# Patient Record
Sex: MALE | Race: BLACK OR AFRICAN AMERICAN | NOT HISPANIC OR LATINO | Employment: FULL TIME
[De-identification: names, ages, dates, MRNs, and addresses within clinical notes are randomized per-mention and may not be internally consistent; named-entity substitution may affect disease eponyms.]

---

## 2021-06-13 ENCOUNTER — TELEPHONE (OUTPATIENT)
Dept: SCHEDULING | Age: 26
End: 2021-06-13

## 2021-07-10 ENCOUNTER — TELEPHONE (OUTPATIENT)
Dept: SCHEDULING | Age: 26
End: 2021-07-10

## 2021-07-12 ENCOUNTER — OFFICE VISIT (OUTPATIENT)
Dept: INTERNAL MEDICINE | Age: 26
End: 2021-07-12

## 2021-07-12 ENCOUNTER — TELEPHONE (OUTPATIENT)
Dept: SCHEDULING | Age: 26
End: 2021-07-12

## 2021-07-12 VITALS
RESPIRATION RATE: 16 BRPM | SYSTOLIC BLOOD PRESSURE: 117 MMHG | HEIGHT: 74 IN | DIASTOLIC BLOOD PRESSURE: 67 MMHG | TEMPERATURE: 98.7 F | HEART RATE: 69 BPM | BODY MASS INDEX: 39.54 KG/M2 | WEIGHT: 308.09 LBS

## 2021-07-12 DIAGNOSIS — Z11.3 ROUTINE SCREENING FOR STI (SEXUALLY TRANSMITTED INFECTION): ICD-10-CM

## 2021-07-12 DIAGNOSIS — E66.01 SEVERE OBESITY (CMD): ICD-10-CM

## 2021-07-12 DIAGNOSIS — Z00.00 VISIT FOR PREVENTIVE HEALTH EXAMINATION: Primary | ICD-10-CM

## 2021-07-12 PROCEDURE — 99385 PREV VISIT NEW AGE 18-39: CPT | Performed by: INTERNAL MEDICINE

## 2021-07-12 ASSESSMENT — PAIN SCALES - GENERAL: PAINLEVEL: 0

## 2022-09-06 ENCOUNTER — HOSPITAL ENCOUNTER (EMERGENCY)
Facility: HOSPITAL | Age: 27
Discharge: HOME | End: 2022-09-07
Attending: EMERGENCY MEDICINE
Payer: COMMERCIAL

## 2022-09-06 ENCOUNTER — APPOINTMENT (OUTPATIENT)
Dept: GENERAL RADIOLOGY | Facility: HOSPITAL | Age: 27
End: 2022-09-06
Payer: COMMERCIAL

## 2022-09-06 ENCOUNTER — APPOINTMENT (OUTPATIENT)
Dept: GENERAL RADIOLOGY | Facility: HOSPITAL | Age: 27
End: 2022-09-06
Attending: EMERGENCY MEDICINE
Payer: COMMERCIAL

## 2022-09-06 DIAGNOSIS — R06.00 DYSPNEA, UNSPECIFIED TYPE: ICD-10-CM

## 2022-09-06 DIAGNOSIS — R07.89 CHEST PAIN, ATYPICAL: Primary | ICD-10-CM

## 2022-09-06 LAB
ANION GAP SERPL CALC-SCNC: 6 MMOL/L (ref 0–18)
BASOPHILS # BLD AUTO: 0.05 X10(3) UL (ref 0–0.2)
BASOPHILS NFR BLD AUTO: 0.6 %
BUN BLD-MCNC: 14 MG/DL (ref 7–18)
BUN/CREAT SERPL: 8.5 (ref 10–20)
CALCIUM BLD-MCNC: 9.5 MG/DL (ref 8.5–10.1)
CHLORIDE SERPL-SCNC: 106 MMOL/L (ref 98–112)
CO2 SERPL-SCNC: 30 MMOL/L (ref 21–32)
CREAT BLD-MCNC: 1.64 MG/DL
D DIMER PPP FEU-MCNC: 0.37 UG/ML FEU (ref ?–0.5)
DEPRECATED RDW RBC AUTO: 36.9 FL (ref 35.1–46.3)
EOSINOPHIL # BLD AUTO: 0.17 X10(3) UL (ref 0–0.7)
EOSINOPHIL NFR BLD AUTO: 2.1 %
ERYTHROCYTE [DISTWIDTH] IN BLOOD BY AUTOMATED COUNT: 11.6 % (ref 11–15)
GFR SERPLBLD BASED ON 1.73 SQ M-ARVRAT: 59 ML/MIN/1.73M2 (ref 60–?)
GLUCOSE BLD-MCNC: 92 MG/DL (ref 70–99)
HCT VFR BLD AUTO: 39.5 %
HGB BLD-MCNC: 13.7 G/DL
IMM GRANULOCYTES # BLD AUTO: 0.02 X10(3) UL (ref 0–1)
IMM GRANULOCYTES NFR BLD: 0.2 %
LYMPHOCYTES # BLD AUTO: 4.2 X10(3) UL (ref 1–4)
LYMPHOCYTES NFR BLD AUTO: 52.4 %
MCH RBC QN AUTO: 30 PG (ref 26–34)
MCHC RBC AUTO-ENTMCNC: 34.7 G/DL (ref 31–37)
MCV RBC AUTO: 86.6 FL
MONOCYTES # BLD AUTO: 0.58 X10(3) UL (ref 0.1–1)
MONOCYTES NFR BLD AUTO: 7.2 %
NEUTROPHILS # BLD AUTO: 2.99 X10 (3) UL (ref 1.5–7.7)
NEUTROPHILS # BLD AUTO: 2.99 X10(3) UL (ref 1.5–7.7)
NEUTROPHILS NFR BLD AUTO: 37.5 %
OSMOLALITY SERPL CALC.SUM OF ELEC: 294 MOSM/KG (ref 275–295)
PLATELET # BLD AUTO: 210 10(3)UL (ref 150–450)
POTASSIUM SERPL-SCNC: 4.2 MMOL/L (ref 3.5–5.1)
RBC # BLD AUTO: 4.56 X10(6)UL
SODIUM SERPL-SCNC: 142 MMOL/L (ref 136–145)
TROPONIN I HIGH SENSITIVITY: 6 NG/L
WBC # BLD AUTO: 8 X10(3) UL (ref 4–11)

## 2022-09-06 PROCEDURE — 80048 BASIC METABOLIC PNL TOTAL CA: CPT | Performed by: EMERGENCY MEDICINE

## 2022-09-06 PROCEDURE — 93005 ELECTROCARDIOGRAM TRACING: CPT

## 2022-09-06 PROCEDURE — 93010 ELECTROCARDIOGRAM REPORT: CPT | Performed by: EMERGENCY MEDICINE

## 2022-09-06 PROCEDURE — 85379 FIBRIN DEGRADATION QUANT: CPT | Performed by: EMERGENCY MEDICINE

## 2022-09-06 PROCEDURE — 99284 EMERGENCY DEPT VISIT MOD MDM: CPT

## 2022-09-06 PROCEDURE — 84484 ASSAY OF TROPONIN QUANT: CPT | Performed by: EMERGENCY MEDICINE

## 2022-09-06 PROCEDURE — 85025 COMPLETE CBC W/AUTO DIFF WBC: CPT | Performed by: EMERGENCY MEDICINE

## 2022-09-06 PROCEDURE — 71045 X-RAY EXAM CHEST 1 VIEW: CPT

## 2022-09-06 PROCEDURE — 36415 COLL VENOUS BLD VENIPUNCTURE: CPT

## 2022-09-06 RX ORDER — OMEPRAZOLE 20 MG/1
20 CAPSULE, DELAYED RELEASE ORAL DAILY
Qty: 30 CAPSULE | Refills: 0 | Status: SHIPPED | OUTPATIENT
Start: 2022-09-06 | End: 2022-10-06

## 2022-09-07 VITALS
TEMPERATURE: 98 F | BODY MASS INDEX: 37.22 KG/M2 | SYSTOLIC BLOOD PRESSURE: 126 MMHG | HEIGHT: 74 IN | OXYGEN SATURATION: 98 % | RESPIRATION RATE: 18 BRPM | HEART RATE: 58 BPM | WEIGHT: 290 LBS | DIASTOLIC BLOOD PRESSURE: 81 MMHG

## 2022-09-07 NOTE — ED INITIAL ASSESSMENT (HPI)
Difficulty breathing x 2 weeks. States he awakens from sleep SOB. significant other reports he has periods of apnea while asleep.

## 2023-08-14 ENCOUNTER — HOSPITAL ENCOUNTER (OUTPATIENT)
Age: 28
Discharge: HOME | End: 2023-08-14
Payer: COMMERCIAL

## 2023-08-14 VITALS
OXYGEN SATURATION: 100 % | SYSTOLIC BLOOD PRESSURE: 155 MMHG | HEIGHT: 74 IN | HEART RATE: 72 BPM | BODY MASS INDEX: 36.45 KG/M2 | DIASTOLIC BLOOD PRESSURE: 80 MMHG | RESPIRATION RATE: 20 BRPM | TEMPERATURE: 99 F | WEIGHT: 284 LBS

## 2023-08-14 DIAGNOSIS — U07.1 COVID-19: Primary | ICD-10-CM

## 2023-08-14 DIAGNOSIS — Z20.822 ENCOUNTER FOR LABORATORY TESTING FOR COVID-19 VIRUS: ICD-10-CM

## 2023-08-14 LAB
BUN BLD-MCNC: 11 MG/DL (ref 7–18)
CHLORIDE BLD-SCNC: 101 MMOL/L (ref 98–112)
CO2 BLD-SCNC: 28 MMOL/L (ref 21–32)
CREAT BLD-MCNC: 1.5 MG/DL
EGFRCR SERPLBLD CKD-EPI 2021: 65 ML/MIN/1.73M2 (ref 60–?)
GLUCOSE BLD-MCNC: 76 MG/DL (ref 70–99)
HCT VFR BLD CALC: 41 %
ISTAT IONIZED CALCIUM FOR CHEM 8: 1.21 MMOL/L (ref 1.12–1.32)
POTASSIUM BLD-SCNC: 3.7 MMOL/L (ref 3.6–5.1)
SARS-COV-2 RNA RESP QL NAA+PROBE: DETECTED
SODIUM BLD-SCNC: 142 MMOL/L (ref 136–145)

## 2023-08-14 PROCEDURE — 99203 OFFICE O/P NEW LOW 30 MIN: CPT | Performed by: NURSE PRACTITIONER

## 2023-08-14 PROCEDURE — U0002 COVID-19 LAB TEST NON-CDC: HCPCS | Performed by: NURSE PRACTITIONER

## 2023-08-14 PROCEDURE — 80047 BASIC METABLC PNL IONIZED CA: CPT | Performed by: NURSE PRACTITIONER

## 2023-08-14 RX ORDER — NIRMATRELVIR AND RITONAVIR 300-100 MG
KIT ORAL
Qty: 30 TABLET | Refills: 0 | Status: SHIPPED | OUTPATIENT
Start: 2023-08-14 | End: 2023-08-14 | Stop reason: CLARIF

## 2023-08-14 RX ORDER — NIRMATRELVIR AND RITONAVIR 300-100 MG
KIT ORAL
Qty: 30 TABLET | Refills: 0 | Status: SHIPPED | OUTPATIENT
Start: 2023-08-14 | End: 2023-08-19

## 2023-08-15 NOTE — DISCHARGE INSTRUCTIONS
Please drink plenty of fluids  Steam showers  Take ibuprofen (Motrin, Advil) 600 mg every 6 hours for fever/aches, take with food  OR  Acetaminophen (Tylenol) 650-1000 mg every 6 hours for fever/aches  Rest!  Mucinex DM twice per day for 7 days, with plenty of fluids  For chest pain, shortness of breath or worsening symptoms, please go to ER  Please see your primary care provider if no improvement in 5-7 days    You tested positive for COVID 19 today  Please quarantine for 5 days, beginning tomorrow.  After the 5 days, you can break quarantine as long as you can wear a mask at all times for an additional 5 days  For chest pain, shortness of breath or worsening symptoms, please go to ER    Please follow up with primary care provider after quarantine to discussed your creatinine

## 2023-09-06 ENCOUNTER — LAB ENCOUNTER (OUTPATIENT)
Dept: LAB | Age: 28
End: 2023-09-06
Attending: INTERNAL MEDICINE
Payer: COMMERCIAL

## 2023-09-06 ENCOUNTER — OFFICE VISIT (OUTPATIENT)
Dept: INTERNAL MEDICINE CLINIC | Facility: CLINIC | Age: 28
End: 2023-09-06

## 2023-09-06 VITALS
WEIGHT: 286 LBS | DIASTOLIC BLOOD PRESSURE: 76 MMHG | HEART RATE: 72 BPM | BODY MASS INDEX: 36.7 KG/M2 | SYSTOLIC BLOOD PRESSURE: 125 MMHG | HEIGHT: 74 IN

## 2023-09-06 DIAGNOSIS — R74.8 ELEVATED CREATINE KINASE: ICD-10-CM

## 2023-09-06 DIAGNOSIS — Z84.1 FAMILY HISTORY OF KIDNEY DISEASE: ICD-10-CM

## 2023-09-06 DIAGNOSIS — Z00.00 ANNUAL PHYSICAL EXAM: Primary | ICD-10-CM

## 2023-09-06 DIAGNOSIS — E55.9 VITAMIN D DEFICIENCY: ICD-10-CM

## 2023-09-06 DIAGNOSIS — Z23 NEED FOR VACCINATION: ICD-10-CM

## 2023-09-06 DIAGNOSIS — Z00.00 ANNUAL PHYSICAL EXAM: ICD-10-CM

## 2023-09-06 DIAGNOSIS — R00.2 PALPITATIONS: ICD-10-CM

## 2023-09-06 LAB
ALBUMIN SERPL-MCNC: 4.1 G/DL (ref 3.4–5)
ALBUMIN/GLOB SERPL: 1 {RATIO} (ref 1–2)
ALP LIVER SERPL-CCNC: 97 U/L
ALT SERPL-CCNC: 36 U/L
ANION GAP SERPL CALC-SCNC: 5 MMOL/L (ref 0–18)
AST SERPL-CCNC: 23 U/L (ref 15–37)
BILIRUB SERPL-MCNC: 1 MG/DL (ref 0.1–2)
BILIRUB UR QL STRIP.AUTO: NEGATIVE
BUN BLD-MCNC: 15 MG/DL (ref 7–18)
CALCIUM BLD-MCNC: 9.2 MG/DL (ref 8.5–10.1)
CHLORIDE SERPL-SCNC: 106 MMOL/L (ref 98–112)
CHOLEST SERPL-MCNC: 131 MG/DL (ref ?–200)
CLARITY UR REFRACT.AUTO: CLEAR
CO2 SERPL-SCNC: 26 MMOL/L (ref 21–32)
COLOR UR AUTO: COLORLESS
CREAT BLD-MCNC: 1.64 MG/DL
EGFRCR SERPLBLD CKD-EPI 2021: 59 ML/MIN/1.73M2 (ref 60–?)
ERYTHROCYTE [DISTWIDTH] IN BLOOD BY AUTOMATED COUNT: 12 %
FASTING PATIENT LIPID ANSWER: NO
FASTING STATUS PATIENT QL REPORTED: NO
GLOBULIN PLAS-MCNC: 4.1 G/DL (ref 2.8–4.4)
GLUCOSE BLD-MCNC: 95 MG/DL (ref 70–99)
GLUCOSE UR STRIP.AUTO-MCNC: NORMAL MG/DL
HCT VFR BLD AUTO: 40.5 %
HDLC SERPL-MCNC: 42 MG/DL (ref 40–59)
HGB BLD-MCNC: 14.2 G/DL
KETONES UR STRIP.AUTO-MCNC: NEGATIVE MG/DL
LDLC SERPL CALC-MCNC: 56 MG/DL (ref ?–100)
LEUKOCYTE ESTERASE UR QL STRIP.AUTO: NEGATIVE
MCH RBC QN AUTO: 30.7 PG (ref 26–34)
MCHC RBC AUTO-ENTMCNC: 35.1 G/DL (ref 31–37)
MCV RBC AUTO: 87.7 FL
NITRITE UR QL STRIP.AUTO: NEGATIVE
NONHDLC SERPL-MCNC: 89 MG/DL (ref ?–130)
OSMOLALITY SERPL CALC.SUM OF ELEC: 285 MOSM/KG (ref 275–295)
PH UR STRIP.AUTO: 5.5 [PH] (ref 5–8)
PLATELET # BLD AUTO: 211 10(3)UL (ref 150–450)
POTASSIUM SERPL-SCNC: 4.2 MMOL/L (ref 3.5–5.1)
PROT SERPL-MCNC: 8.2 G/DL (ref 6.4–8.2)
PROT UR STRIP.AUTO-MCNC: NEGATIVE MG/DL
RBC # BLD AUTO: 4.62 X10(6)UL
RBC UR QL AUTO: NEGATIVE
SODIUM SERPL-SCNC: 137 MMOL/L (ref 136–145)
SP GR UR STRIP.AUTO: 1.01 (ref 1–1.03)
TRIGL SERPL-MCNC: 202 MG/DL (ref 30–149)
TSI SER-ACNC: 2 MIU/ML (ref 0.36–3.74)
UROBILINOGEN UR STRIP.AUTO-MCNC: NORMAL MG/DL
VIT D+METAB SERPL-MCNC: 24.9 NG/ML (ref 30–100)
VLDLC SERPL CALC-MCNC: 29 MG/DL (ref 0–30)
WBC # BLD AUTO: 7.4 X10(3) UL (ref 4–11)

## 2023-09-06 PROCEDURE — 85027 COMPLETE CBC AUTOMATED: CPT | Performed by: INTERNAL MEDICINE

## 2023-09-06 PROCEDURE — 80061 LIPID PANEL: CPT | Performed by: INTERNAL MEDICINE

## 2023-09-06 PROCEDURE — 99385 PREV VISIT NEW AGE 18-39: CPT | Performed by: INTERNAL MEDICINE

## 2023-09-06 PROCEDURE — 84443 ASSAY THYROID STIM HORMONE: CPT | Performed by: INTERNAL MEDICINE

## 2023-09-06 PROCEDURE — 83036 HEMOGLOBIN GLYCOSYLATED A1C: CPT | Performed by: INTERNAL MEDICINE

## 2023-09-06 PROCEDURE — 36415 COLL VENOUS BLD VENIPUNCTURE: CPT | Performed by: INTERNAL MEDICINE

## 2023-09-06 PROCEDURE — 81003 URINALYSIS AUTO W/O SCOPE: CPT

## 2023-09-06 PROCEDURE — 80053 COMPREHEN METABOLIC PANEL: CPT | Performed by: INTERNAL MEDICINE

## 2023-09-06 PROCEDURE — 3008F BODY MASS INDEX DOCD: CPT | Performed by: INTERNAL MEDICINE

## 2023-09-06 PROCEDURE — 3078F DIAST BP <80 MM HG: CPT | Performed by: INTERNAL MEDICINE

## 2023-09-06 PROCEDURE — 82306 VITAMIN D 25 HYDROXY: CPT | Performed by: INTERNAL MEDICINE

## 2023-09-06 PROCEDURE — 3074F SYST BP LT 130 MM HG: CPT | Performed by: INTERNAL MEDICINE

## 2023-09-07 LAB
EST. AVERAGE GLUCOSE BLD GHB EST-MCNC: 108 MG/DL (ref 68–126)
HBA1C MFR BLD: 5.4 % (ref ?–5.7)

## 2023-12-06 ENCOUNTER — LAB ENCOUNTER (OUTPATIENT)
Dept: LAB | Facility: HOSPITAL | Age: 28
End: 2023-12-06
Attending: INTERNAL MEDICINE
Payer: COMMERCIAL

## 2023-12-06 ENCOUNTER — HOSPITAL ENCOUNTER (OUTPATIENT)
Dept: ULTRASOUND IMAGING | Facility: HOSPITAL | Age: 28
Discharge: HOME | End: 2023-12-06
Attending: INTERNAL MEDICINE
Payer: COMMERCIAL

## 2023-12-06 ENCOUNTER — HOSPITAL ENCOUNTER (OUTPATIENT)
Dept: CV DIAGNOSTICS | Facility: HOSPITAL | Age: 28
Discharge: HOME | End: 2023-12-06
Attending: INTERNAL MEDICINE
Payer: COMMERCIAL

## 2023-12-06 DIAGNOSIS — D58.2 ABNORMAL HEMOGLOBIN (HCC): ICD-10-CM

## 2023-12-06 DIAGNOSIS — R00.2 PALPITATIONS: ICD-10-CM

## 2023-12-06 DIAGNOSIS — R74.8 ELEVATED CREATINE KINASE: ICD-10-CM

## 2023-12-06 DIAGNOSIS — Z84.1 FAMILY HISTORY OF KIDNEY DISEASE: ICD-10-CM

## 2023-12-06 LAB
HGB A2 MFR BLD: 3.2 % (ref 1.5–3.5)
HGB F MFR BLD: 0.2 % (ref 0–2)
HGB PNL BLD ELPH: 57.2 % (ref 95.5–100)
HGB S BLD QL SOLY: POSITIVE
HGB S MFR BLD: 39.4 %

## 2023-12-06 PROCEDURE — 76775 US EXAM ABDO BACK WALL LIM: CPT | Performed by: INTERNAL MEDICINE

## 2023-12-06 PROCEDURE — 83021 HEMOGLOBIN CHROMOTOGRAPHY: CPT

## 2023-12-06 PROCEDURE — 93975 VASCULAR STUDY: CPT | Performed by: INTERNAL MEDICINE

## 2023-12-06 PROCEDURE — 93242 EXT ECG>48HR<7D RECORDING: CPT | Performed by: INTERNAL MEDICINE

## 2023-12-06 PROCEDURE — 93244 EXT ECG>48HR<7D REV&INTERPJ: CPT | Performed by: INTERNAL MEDICINE

## 2023-12-06 PROCEDURE — 83020 HEMOGLOBIN ELECTROPHORESIS: CPT

## 2023-12-06 PROCEDURE — 93243 EXT ECG>48HR<7D SCAN A/R: CPT | Performed by: INTERNAL MEDICINE

## 2023-12-06 PROCEDURE — 85660 RBC SICKLE CELL TEST: CPT

## 2023-12-06 PROCEDURE — 36415 COLL VENOUS BLD VENIPUNCTURE: CPT

## 2024-06-28 ENCOUNTER — OFFICE VISIT (OUTPATIENT)
Dept: FAMILY MEDICINE CLINIC | Facility: CLINIC | Age: 29
End: 2024-06-28

## 2024-06-28 VITALS
DIASTOLIC BLOOD PRESSURE: 78 MMHG | BODY MASS INDEX: 37.73 KG/M2 | HEART RATE: 93 BPM | HEIGHT: 74 IN | TEMPERATURE: 99 F | WEIGHT: 294 LBS | OXYGEN SATURATION: 100 % | RESPIRATION RATE: 16 BRPM | SYSTOLIC BLOOD PRESSURE: 138 MMHG

## 2024-06-28 DIAGNOSIS — R52 BODY ACHES: Primary | ICD-10-CM

## 2024-06-28 DIAGNOSIS — Z86.19 HISTORY OF HERPES GENITALIS: ICD-10-CM

## 2024-06-28 PROCEDURE — 3078F DIAST BP <80 MM HG: CPT | Performed by: NURSE PRACTITIONER

## 2024-06-28 PROCEDURE — 3008F BODY MASS INDEX DOCD: CPT | Performed by: NURSE PRACTITIONER

## 2024-06-28 PROCEDURE — 3075F SYST BP GE 130 - 139MM HG: CPT | Performed by: NURSE PRACTITIONER

## 2024-06-28 PROCEDURE — 99213 OFFICE O/P EST LOW 20 MIN: CPT | Performed by: NURSE PRACTITIONER

## 2024-06-28 PROCEDURE — 87491 CHLMYD TRACH DNA AMP PROBE: CPT | Performed by: NURSE PRACTITIONER

## 2024-06-28 PROCEDURE — 87591 N.GONORRHOEAE DNA AMP PROB: CPT | Performed by: NURSE PRACTITIONER

## 2024-06-28 RX ORDER — VALACYCLOVIR HYDROCHLORIDE 500 MG/1
500 TABLET, FILM COATED ORAL 2 TIMES DAILY
Qty: 6 TABLET | Refills: 0 | Status: SHIPPED | OUTPATIENT
Start: 2024-06-28 | End: 2024-07-01

## 2024-06-28 NOTE — PROGRESS NOTES
CHIEF COMPLAINT:     Chief Complaint   Patient presents with    Headache     Sx yesterday - Frontal headache, neck pain  Denies cough, ST, fever, SOB, chest pain, nasal congestion, ear pain/pressure  No Covid test was done at home  No OTC    Sexually Transmitted Infection Screen     Asymptomatic  Denies new partners, itching, pain, etc       HPI:   Arnav Escamilla is a 28 year old male who presents with request for STD testing and covid testing. Pt unsure of covid exposure but some co-workers ill with URI symptoms. PT reports headache and neck soreness since yesterday. States has had similar symptoms with HSV however denies lesions. PT states no fever or chills. No cough or congestion. No rhinorrhea. No chest pain or SOB.     Pt would also like to be tested for gonorrhea and chlamydia. No new partners. No itching or penile discharge. No testicular pain. No itching. No dsyuria. Pt does report pimple on left thigh. Pt stated hx of HSV with blood test however no hx of sores.     Current Outpatient Medications   Medication Sig Dispense Refill    valACYclovir (VALTREX) 500 MG Oral Tab Take 1 tablet (500 mg total) by mouth 2 (two) times daily for 3 days. 6 tablet 0      History reviewed. No pertinent past medical history.   Social History:  Social History     Socioeconomic History    Marital status: Single   Tobacco Use    Smoking status: Never     Passive exposure: Never    Smokeless tobacco: Never   Vaping Use    Vaping status: Never Used   Substance and Sexual Activity    Alcohol use: Yes     Comment: socially    Drug use: Never        REVIEW OF SYSTEMS:   GENERAL: Denies fever, chills,weight change, decreased appetite  SKIN: see hpi.  EYES: Denies blurred vision or double vision  HENT: Denies congestion, rhinorrhea, sore throat or ear pain  CHEST: Denies chest pain, or palpitations  LUNGS: Denies shortness of breath, cough, or wheezing  GI: Denies abdominal pain, N/V/C/D.   MUSCULOSKELETAL: no arthralgia or  swollen joints  LYMPH:  Denies lymphadenopathy  NEURO: Denies headaches or lightheadedness      EXAM:   /78   Pulse 93   Temp 98.7 °F (37.1 °C)   Resp 16   Ht 6' 2\" (1.88 m)   Wt 294 lb (133.4 kg)   SpO2 100%   BMI 37.75 kg/m²   GENERAL: well developed, well nourished,in no apparent distress  SKIN: no rashes,no suspicious lesions  HEAD: atraumatic, normocephalic  EYES: conjunctiva clear, EOM intact, PERRLA  THROAT: oral mucosa pink, moist. No visible dental caries. Posterior pharynx is not erythematous. no exudates.  NECK: supple, non-tender, Full ROM of neck.   LUNGS: clear to auscultation bilaterally, no wheezes or rhonchi. Breathing is non labored.  CARDIO: RRR without murmur  Gential: No lesions/sores on penis or scrotum noted. No testicular pain with palpitation. No penile discharge. Pt offered chaperone - decline.   EXTREMITIES: no cyanosis, clubbing or edema  LYMPH:  No lymphadenopathy including inguinal area.     ASSESSMENT AND PLAN:     ASSESSMENT:  Encounter Diagnoses   Name Primary?    Body aches Yes    History of herpes genitalis        PLAN:    Meds:  - valACYclovir (VALTREX) 500 MG Oral Tab; Take 1 tablet (500 mg total) by mouth 2 (two) times daily for 3 days.  Dispense: 6 tablet; Refill: 0      COVID test sent. Discussed with patient non specific symptoms. Denies fever or vision changes.     Gonorrhea and chlamydia testing sent. Recommended full battery of STD testing to patient.     PT reports hx of HSV in which he was given valtrex with similar symptoms. Rx valtrex given if any lesions develop.     Discussed s/s of worsening infection/condition with Patient and importance of prompt medical re-evaluation including when to seek emergency care. Patient  voiced understanding    May consider OTC tylenol or ibuprofen as needed and directed on packaging for pain/fever    Risks, benefits, and side effects of medication discussed. Patient  verbalized understanding and agreement with treatment  plan.     All questions and concerns addressed. Encouraged Patient  to call clinic with any questions or concerns. I explained to the patient that emergent conditions may arise and to go to the ER for new, worsening or any persistent conditions.    Patient Instructions   See attached patient care instructions.        The patient indicates understanding of these issues and agrees to the plan.

## 2024-06-29 LAB — SARS-COV-2 RNA RESP QL NAA+PROBE: NOT DETECTED

## 2024-07-01 LAB
C TRACH DNA SPEC QL NAA+PROBE: NEGATIVE
N GONORRHOEA DNA SPEC QL NAA+PROBE: NEGATIVE

## 2024-07-26 ENCOUNTER — OFFICE VISIT (OUTPATIENT)
Facility: LOCATION | Age: 29
End: 2024-07-26
Payer: COMMERCIAL

## 2024-07-26 VITALS
BODY MASS INDEX: 37.22 KG/M2 | WEIGHT: 290 LBS | SYSTOLIC BLOOD PRESSURE: 139 MMHG | OXYGEN SATURATION: 98 % | HEART RATE: 90 BPM | DIASTOLIC BLOOD PRESSURE: 80 MMHG | HEIGHT: 74 IN

## 2024-07-26 DIAGNOSIS — B00.9 HSV-2 (HERPES SIMPLEX VIRUS 2) INFECTION: ICD-10-CM

## 2024-07-26 DIAGNOSIS — N64.9 NIPPLE LESION: ICD-10-CM

## 2024-07-26 DIAGNOSIS — M62.838 MUSCLE SPASMS OF NECK: Primary | ICD-10-CM

## 2024-07-26 DIAGNOSIS — B35.9 TINEA: ICD-10-CM

## 2024-07-26 PROCEDURE — 99214 OFFICE O/P EST MOD 30 MIN: CPT | Performed by: INTERNAL MEDICINE

## 2024-07-26 PROCEDURE — 3008F BODY MASS INDEX DOCD: CPT | Performed by: INTERNAL MEDICINE

## 2024-07-26 PROCEDURE — 3079F DIAST BP 80-89 MM HG: CPT | Performed by: INTERNAL MEDICINE

## 2024-07-26 PROCEDURE — 3075F SYST BP GE 130 - 139MM HG: CPT | Performed by: INTERNAL MEDICINE

## 2024-07-26 RX ORDER — CLOTRIMAZOLE 1 %
1 CREAM (GRAM) TOPICAL 2 TIMES DAILY PRN
Qty: 113 G | Refills: 5 | Status: SHIPPED | OUTPATIENT
Start: 2024-07-26

## 2024-07-26 RX ORDER — CLOTRIMAZOLE AND BETAMETHASONE DIPROPIONATE 10; .64 MG/G; MG/G
1 CREAM TOPICAL 2 TIMES DAILY
Qty: 45 G | Refills: 3 | Status: SHIPPED | OUTPATIENT
Start: 2024-07-26 | End: 2024-08-05

## 2024-07-26 NOTE — PROGRESS NOTES
INTERNAL MEDICINE OFFICE NOTE     Patient ID: Arnav Escamilla is a 28 year old male.  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 Has been having lots of headaches. 2 weeks ago back causing pain in his spine. Lasts for an hour or so then resolves. Having lots of neck pain. Able to move neck in office, no having any photophobia. Sleeps on side.   Dry skin, rash. Tinea on thighs. Used hydrocortisone imporoved at first but now worsened.   4. Right nipple mass- firm- nontender, decreased in size over past few weeks but still with lesion- no drainage- reabsorbed, has some under arm as well and resolve.     Vitals:    07/26/24 0835   BP: 139/80   Pulse: 90   SpO2: 98%   Weight: 290 lb (131.5 kg)   Height: 6' 2\" (1.88 m)     body mass index is 37.23 kg/m².  BP Readings from Last 3 Encounters:   07/26/24 139/80   06/28/24 138/78   09/06/23 125/76     The ASCVD Risk score (Mynor DK, et al., 2019) failed to calculate for the following reasons:    The 2019 ASCVD risk score is only valid for ages 40 to 79  Medications reviewed:  Current Outpatient Medications   Medication Sig Dispense Refill    clotrimazole-betamethasone 1-0.05 % External Cream Apply 1 Application topically 2 (two) times daily for 10 days. FOR RASH. THEN SWITCH TO CLORTRIMAZOLE ONLY AFTER RESOLUTION FOR MAINTENANCE. 45 g 3    clotrimazole 1 % External Cream Apply 1 Application topically 2 (two) times daily as needed (rash). 113 g 5         Assessment & Plan    1. Muscle spasms of neck (Primary)  2. Nipple lesion  -     US BREAST LEFT LIMITED (CPT=76642); Future; Expected date: 07/26/2024  3. Tinea  -     Clotrimazole-Betamethasone; Apply 1 Application topically 2 (two) times daily for 10 days. FOR RASH. THEN SWITCH TO CLORTRIMAZOLE ONLY AFTER RESOLUTION FOR MAINTENANCE.  Dispense: 45 g; Refill: 3  -     Clotrimazole; Apply 1 Application topically 2 (two)  times daily as needed (rash).  Dispense: 113 g; Refill: 5    I suspect his symptoms are related to neck spasm, we discussed the pathophysiology of HSV, although he may have the symptoms but it does not mean he will trip symptoms, he does have any burning lesions on the groin genitals or on the mouth let me know right away and we will start him on Valtrex, if he would like to do a smear for these lesions he will need to come to the office and I will perform.  For the tinea trial of clotrimazole betamethasone and once he has resolution can use clotrimazole only for maintenance.  He does have a left sided nipple lesion, unclear if this was an abscess but did not have any discharge, no obvious clinical findings aside from the mass therefore will get ultrasound of left breast for evaluation.    Follow Up: Return in about 6 weeks (around 9/6/2024) for ANNUAL EXAM..         Objective: Results:   Physical Exam  Vitals and nursing note reviewed.   Constitutional:       General: He is not in acute distress.     Appearance: Normal appearance.   HENT:      Head: Normocephalic.      Right Ear: External ear normal.      Left Ear: External ear normal.   Eyes:      Extraocular Movements: Extraocular movements intact.      Conjunctiva/sclera: Conjunctivae normal.   Pulmonary:      Effort: Pulmonary effort is normal.   Chest:       Musculoskeletal:         General: Normal range of motion.      Cervical back: Normal range of motion and neck supple. Pain with movement and muscular tenderness present. No spinous process tenderness. Normal range of motion.   Skin:     Coloration: Skin is not jaundiced.      Findings: Rash present.   Neurological:      General: No focal deficit present.      Mental Status: He is alert and oriented to person, place, and time. Mental status is at baseline.   Psychiatric:         Mood and Affect: Mood normal.         Behavior: Behavior normal.        Reviewed:    Patient Active Problem List    Diagnosis     Family history of kidney disease      No Known Allergies     Social History     Socioeconomic History    Marital status: Single   Tobacco Use    Smoking status: Never     Passive exposure: Never    Smokeless tobacco: Never   Vaping Use    Vaping status: Never Used   Substance and Sexual Activity    Alcohol use: Yes     Comment: socially    Drug use: Never      Review of Systems   Skin:  Positive for rash.     All other systems negative unless otherwise stated in ROS or HPI above.       Kenny Butt MD  Internal Medicine       Call office with any questions or seek emergency care if necessary.   Patient understands and agrees to follow directions and advice.      ----------------------------------------- PATIENT INSTRUCTIONS-----------------------------------------   .    Patient Instructions   Constipation:  Start 1 cap of MiraLAX daily to help soften your stools, this can be mixed with prune juice or water.  MiraLAX acts like a sponge in your gut to help your stools absorb water and therefore soften them.  Stop using MiraLAX if you have diarrhea.  After 2 servings of MiraLAX start taking senna, this is a stimulant laxative to help stimulate the bowels and create a bowel movement.  Take this daily for the next 3 to 5 days or until you have a bowel movement.  If you have pain stop senna, repeat step 1 for another day, then resume senna.  Drink at least 2 to 3 L of water per day  Increase your physical activity with walking or running to help move your abdominal muscles  You should have a good bowel movement over the next few days, once you produce a bowel movement then start using Metamucil daily for regularity.  6. Start SEED probiotic.     For your neck-in the absence of photophobia, in the absence of severe neck pain, in the absence of fever or nausea it is very unlikely that HSV would cause any of your current symptoms, I suspect a lot of this is due to sleeping in an odd position or working on the  computer/using her phone.  Please stretch the chest muscles, please also strengthen the upper back and I would like for you to incorporate some yoga stretches to see if this resolves your symptoms.  Use heat and massage but absolutely no chiropractor.  Sammy and Asaf the physical therapy guys on [employer] are excellent    2.  Please get an ultrasound of the left nipple, there is a chance that the radiology service will add in some additional testing and that is perfectly fine.  I suspect this may have been an abscess or some sort of trauma but since there is still a lesion there we will double check for completeness.

## 2024-07-26 NOTE — PATIENT INSTRUCTIONS
Constipation:  Start 1 cap of MiraLAX daily to help soften your stools, this can be mixed with prune juice or water.  MiraLAX acts like a sponge in your gut to help your stools absorb water and therefore soften them.  Stop using MiraLAX if you have diarrhea.  After 2 servings of MiraLAX start taking senna, this is a stimulant laxative to help stimulate the bowels and create a bowel movement.  Take this daily for the next 3 to 5 days or until you have a bowel movement.  If you have pain stop senna, repeat step 1 for another day, then resume senna.  Drink at least 2 to 3 L of water per day  Increase your physical activity with walking or running to help move your abdominal muscles  You should have a good bowel movement over the next few days, once you produce a bowel movement then start using Metamucil daily for regularity.  6. Start SEED probiotic.     For your neck-in the absence of photophobia, in the absence of severe neck pain, in the absence of fever or nausea it is very unlikely that HSV would cause any of your current symptoms, I suspect a lot of this is due to sleeping in an odd position or working on the computer/using her phone.  Please stretch the chest muscles, please also strengthen the upper back and I would like for you to incorporate some yoga stretches to see if this resolves your symptoms.  Use heat and massage but absolutely no chiropractor.  Sammy and [employer]af the physical therapy guys on [employer] are excellent    2.  Please get an ultrasound of the left nipple, there is a chance that the radiology service will add in some additional testing and that is perfectly fine.  I suspect this may have been an abscess or some sort of trauma but since there is still a lesion there we will double check for completeness.

## 2024-09-18 ENCOUNTER — TELEPHONE (OUTPATIENT)
Facility: LOCATION | Age: 29
End: 2024-09-18

## 2024-09-18 DIAGNOSIS — N63.0 MASS OF NIPPLE: Primary | ICD-10-CM

## 2024-09-18 NOTE — TELEPHONE ENCOUNTER
Pended.  Please supply diagnostic code.  On 7/26/24 US Beast Left limited   stated DX: Nipple lesion (N64.9)   No prior mammogram noted.

## 2024-09-18 NOTE — TELEPHONE ENCOUNTER
Nancy from radiology called requesting an order for the patient diagnostic bilateral mammogram with ultrasound if needed

## 2024-09-20 ENCOUNTER — LAB ENCOUNTER (OUTPATIENT)
Dept: LAB | Facility: REFERENCE LAB | Age: 29
End: 2024-09-20
Attending: INTERNAL MEDICINE
Payer: COMMERCIAL

## 2024-09-20 ENCOUNTER — OFFICE VISIT (OUTPATIENT)
Facility: LOCATION | Age: 29
End: 2024-09-20

## 2024-09-20 VITALS
SYSTOLIC BLOOD PRESSURE: 138 MMHG | OXYGEN SATURATION: 98 % | BODY MASS INDEX: 38.37 KG/M2 | HEART RATE: 92 BPM | HEIGHT: 74 IN | DIASTOLIC BLOOD PRESSURE: 82 MMHG | WEIGHT: 299 LBS

## 2024-09-20 DIAGNOSIS — Z13.21 SCREENING FOR ENDOCRINE, NUTRITIONAL, METABOLIC AND IMMUNITY DISORDER: ICD-10-CM

## 2024-09-20 DIAGNOSIS — I10 PRIMARY HYPERTENSION: ICD-10-CM

## 2024-09-20 DIAGNOSIS — Z00.00 ANNUAL PHYSICAL EXAM: Primary | ICD-10-CM

## 2024-09-20 DIAGNOSIS — Z13.228 SCREENING FOR ENDOCRINE, NUTRITIONAL, METABOLIC AND IMMUNITY DISORDER: ICD-10-CM

## 2024-09-20 DIAGNOSIS — Z23 ENCOUNTER FOR IMMUNIZATION: ICD-10-CM

## 2024-09-20 DIAGNOSIS — R06.81 WITNESSED EPISODE OF APNEA: ICD-10-CM

## 2024-09-20 DIAGNOSIS — Z13.0 SCREENING FOR ENDOCRINE, NUTRITIONAL, METABOLIC AND IMMUNITY DISORDER: ICD-10-CM

## 2024-09-20 DIAGNOSIS — E55.9 VITAMIN D DEFICIENCY: ICD-10-CM

## 2024-09-20 DIAGNOSIS — Z13.29 SCREENING FOR ENDOCRINE, NUTRITIONAL, METABOLIC AND IMMUNITY DISORDER: ICD-10-CM

## 2024-09-20 LAB
ALBUMIN SERPL-MCNC: 4.9 G/DL (ref 3.2–4.8)
ALBUMIN/GLOB SERPL: 1.4 {RATIO} (ref 1–2)
ALP LIVER SERPL-CCNC: 90 U/L
ALT SERPL-CCNC: 30 U/L
ANION GAP SERPL CALC-SCNC: 6 MMOL/L (ref 0–18)
AST SERPL-CCNC: 24 U/L (ref ?–34)
BILIRUB SERPL-MCNC: 1.7 MG/DL (ref 0.3–1.2)
BUN BLD-MCNC: 15 MG/DL (ref 9–23)
BUN/CREAT SERPL: 9.6 (ref 10–20)
CALCIUM BLD-MCNC: 10.5 MG/DL (ref 8.7–10.4)
CHLORIDE SERPL-SCNC: 105 MMOL/L (ref 98–112)
CHOLEST SERPL-MCNC: 159 MG/DL (ref ?–200)
CO2 SERPL-SCNC: 30 MMOL/L (ref 21–32)
CREAT BLD-MCNC: 1.57 MG/DL
DEPRECATED RDW RBC AUTO: 37.4 FL (ref 35.1–46.3)
EGFRCR SERPLBLD CKD-EPI 2021: 62 ML/MIN/1.73M2 (ref 60–?)
ERYTHROCYTE [DISTWIDTH] IN BLOOD BY AUTOMATED COUNT: 11.9 % (ref 11–15)
EST. AVERAGE GLUCOSE BLD GHB EST-MCNC: 108 MG/DL (ref 68–126)
FASTING PATIENT LIPID ANSWER: YES
FASTING STATUS PATIENT QL REPORTED: YES
GLOBULIN PLAS-MCNC: 3.6 G/DL (ref 2–3.5)
GLUCOSE BLD-MCNC: 98 MG/DL (ref 70–99)
HBA1C MFR BLD: 5.4 % (ref ?–5.7)
HCT VFR BLD AUTO: 41.1 %
HDLC SERPL-MCNC: 45 MG/DL (ref 40–59)
HGB BLD-MCNC: 14.3 G/DL
LDLC SERPL CALC-MCNC: 102 MG/DL (ref ?–100)
MCH RBC QN AUTO: 29.8 PG (ref 26–34)
MCHC RBC AUTO-ENTMCNC: 34.8 G/DL (ref 31–37)
MCV RBC AUTO: 85.6 FL
NONHDLC SERPL-MCNC: 114 MG/DL (ref ?–130)
OSMOLALITY SERPL CALC.SUM OF ELEC: 293 MOSM/KG (ref 275–295)
PLATELET # BLD AUTO: 216 10(3)UL (ref 150–450)
POTASSIUM SERPL-SCNC: 4.7 MMOL/L (ref 3.5–5.1)
PROT SERPL-MCNC: 8.5 G/DL (ref 5.7–8.2)
RBC # BLD AUTO: 4.8 X10(6)UL
SODIUM SERPL-SCNC: 141 MMOL/L (ref 136–145)
TRIGL SERPL-MCNC: 60 MG/DL (ref 30–149)
TSI SER-ACNC: 2.07 MIU/ML (ref 0.55–4.78)
VIT D+METAB SERPL-MCNC: 42.7 NG/ML (ref 30–100)
VLDLC SERPL CALC-MCNC: 10 MG/DL (ref 0–30)
WBC # BLD AUTO: 5.1 X10(3) UL (ref 4–11)

## 2024-09-20 PROCEDURE — 84443 ASSAY THYROID STIM HORMONE: CPT | Performed by: INTERNAL MEDICINE

## 2024-09-20 PROCEDURE — 80061 LIPID PANEL: CPT | Performed by: INTERNAL MEDICINE

## 2024-09-20 PROCEDURE — 85027 COMPLETE CBC AUTOMATED: CPT | Performed by: INTERNAL MEDICINE

## 2024-09-20 PROCEDURE — 83036 HEMOGLOBIN GLYCOSYLATED A1C: CPT | Performed by: INTERNAL MEDICINE

## 2024-09-20 PROCEDURE — 80053 COMPREHEN METABOLIC PANEL: CPT | Performed by: INTERNAL MEDICINE

## 2024-09-20 PROCEDURE — 36415 COLL VENOUS BLD VENIPUNCTURE: CPT | Performed by: INTERNAL MEDICINE

## 2024-09-20 PROCEDURE — 82306 VITAMIN D 25 HYDROXY: CPT | Performed by: INTERNAL MEDICINE

## 2024-09-20 RX ORDER — TELMISARTAN 20 MG/1
20 TABLET ORAL NIGHTLY
Qty: 90 TABLET | Refills: 3 | Status: SHIPPED | OUTPATIENT
Start: 2024-09-20

## 2024-09-20 NOTE — PROGRESS NOTES
INTERNAL MEDICINE ANNUAL EXAM NOTE     Patient ID: Arnav Escamilla is a 29 year old male.  Chief Complaint: Physical      Arnav Escamilla is a pleasant 29 year old male who presents for annual physical exam. Arnav Escamilla is doing well today.  Breast mass still there- improving but has mammo scheduled.   2.  He has been having witnessed apnea, his mother states that he stops breathing at night, patient wakes up very fatigued in the morning, he has been gaining weight and his blood pressure is elevated.  3.  He has hypertension, he is had multiple episodes of elevated blood pressure, he is borderline greater than 140 over 80s however given his young age and strong family history we discussed starting blood pressure medications.  He is following a low-sodium diet but exercise is difficult due to school.    Health Maintenance  - All care gaps addressed with patient.     Review of Systems  Review of Systems   Constitutional:  Negative for unexpected weight change.   HENT:  Negative for hearing loss.    Eyes:  Negative for pain and visual disturbance.   Respiratory:  Negative for shortness of breath.    Cardiovascular:  Negative for chest pain, palpitations and leg swelling.   Gastrointestinal:  Negative for abdominal pain and blood in stool.   Genitourinary:  Negative for difficulty urinating and hematuria.   Neurological:  Negative for tremors and syncope.   Psychiatric/Behavioral: Negative.         Physical Exam  Vitals:    09/20/24 0817   BP: 138/82   Pulse: 92   SpO2: 98%   Weight: 299 lb (135.6 kg)   Height: 6' 2\" (1.88 m)     Body mass index is 38.39 kg/m².  BP Readings from Last 3 Encounters:   09/20/24 138/82   07/26/24 139/80   06/28/24 138/78     Physical Exam  Vitals and nursing note reviewed.   Constitutional:       General: He is not in acute distress.     Appearance: Normal appearance.   HENT:      Head: Normocephalic.      Right Ear: External ear normal.      Left Ear:  External ear normal.   Eyes:      Extraocular Movements: Extraocular movements intact.      Conjunctiva/sclera: Conjunctivae normal.   Cardiovascular:      Rate and Rhythm: Normal rate and regular rhythm.      Pulses: Normal pulses.      Heart sounds: Normal heart sounds.   Pulmonary:      Effort: Pulmonary effort is normal. No respiratory distress.      Breath sounds: Normal breath sounds. No wheezing.   Abdominal:      General: Abdomen is flat. Bowel sounds are normal.      Tenderness: There is no abdominal tenderness.   Musculoskeletal:         General: Normal range of motion.      Cervical back: Normal range of motion and neck supple.   Skin:     Coloration: Skin is not jaundiced.   Neurological:      General: No focal deficit present.      Mental Status: He is alert and oriented to person, place, and time. Mental status is at baseline.   Psychiatric:         Mood and Affect: Mood normal.         Behavior: Behavior normal.           Labs & Imaging  Pertinent labs and imaging reviewed.   Lab Results   Component Value Date    GLU 95 09/06/2023    BUN 15 09/06/2023    BUNCREA 8.5 (L) 09/06/2022    CREATSERUM 1.64 (H) 09/06/2023    ANIONGAP 5 09/06/2023    CA 9.2 09/06/2023    OSMOCALC 285 09/06/2023    ALKPHO 97 09/06/2023    AST 23 09/06/2023    ALT 36 09/06/2023    BILT 1.0 09/06/2023    TP 8.2 09/06/2023    ALB 4.1 09/06/2023    GLOBULIN 4.1 09/06/2023     09/06/2023    K 4.2 09/06/2023     09/06/2023    CO2 26.0 09/06/2023     Lab Results   Component Value Date     09/06/2023    A1C 5.4 09/06/2023     Lab Results   Component Value Date    WBC 7.4 09/06/2023    RBC 4.62 09/06/2023    HGB 14.2 09/06/2023    HCT 40.5 09/06/2023    MCV 87.7 09/06/2023    MCH 30.7 09/06/2023    MCHC 35.1 09/06/2023    RDW 12.0 09/06/2023    .0 09/06/2023     Lab Results   Component Value Date    CHOLEST 131 09/06/2023    TRIG 202 (H) 09/06/2023    HDL 42 09/06/2023    LDL 56 09/06/2023    VLDL 29 09/06/2023     [facility] 89 09/06/2023     The ASCVD Risk score (Mynor GONZALEZ, et al., 2019) failed to calculate for the following reasons:    The 2019 ASCVD risk score is only valid for ages 40 to 79    Medical History    Reviewed allergies:  No Known Allergies     Reviewed:  Patient Active Problem List    Diagnosis    Family history of kidney disease      Reviewed:  History reviewed. No pertinent past medical history.   Reviewed:  History reviewed. No pertinent family history.    Reviewed:  History reviewed. No pertinent surgical history.   Reviewed:  Social History     Socioeconomic History    Marital status: Single   Tobacco Use    Smoking status: Never     Passive exposure: Never    Smokeless tobacco: Never   Vaping Use    Vaping status: Never Used   Substance and Sexual Activity    Alcohol use: Yes     Comment: socially    Drug use: Never      Reviewed:  Current Outpatient Medications   Medication Sig Dispense Refill    Telmisartan 20 MG Oral Tab Take 1 tablet (20 mg total) by mouth at bedtime. FOR BLOOD PRESSURE. START IF HOME PRESSURES ARE GREATER THAN 130/80 FOR MORE THAN 5 CONSECUTIVE VALUES. 90 tablet 3    clotrimazole 1 % External Cream Apply 1 Application topically 2 (two) times daily as needed (rash). 113 g 5          Assessment & Plan    1. Annual physical exam  - Comp Metabolic Panel (14)  - Hemoglobin A1C  - CBC, Platelet; No Differential  - Lipid Panel  - TSH W Reflex To Free T4    2. Screening for endocrine, nutritional, metabolic and immunity disorder  - Comp Metabolic Panel (14)  - Hemoglobin A1C  - CBC, Platelet; No Differential  - Lipid Panel  - TSH W Reflex To Free T4  - Vitamin D    3. Vitamin D deficiency  - Vitamin D    4. Witnessed episode of apnea  - Home Sleep Apnea Test (Adult pt only) - Sleep consult required for Medicare pts  - General sleep study    5. Primary hypertension  - Telmisartan 20 MG Oral Tab; Take 1 tablet (20 mg total) by mouth at bedtime. FOR BLOOD PRESSURE. START IF HOME PRESSURES ARE  GREATER THAN 130/80 FOR MORE THAN 5 CONSECUTIVE VALUES.  Dispense: 90 tablet; Refill: 3    6. Encounter for immunization  - TdaP (Boostrix) Vaccine (> 7 Y)  Plan  Overall doing well today. Screening labs, preventive imaging/procedure, and health care gaps addressed as per USPSTF guidelines, orders available electronically via [employer] and in [facility].  Vaccines discussed and administered depending on availability and per patient preference; patient to return for any outstanding vaccines once available.  Patient brought to  to help schedule care gaps and follow up. Further recommendations depending on lab results.  His blood pressure is elevated, it has been elevated on multiple occasions, given his young age of asked to monitor at home and if he is consistently greater than 130/80 we will start him on telmisartan, there is a strong family history.  I will also get sleep study as this is likely contributing.  Will plan to see him back a few weeks after he starts telmisartan but have asked him to let me know if he does start this based upon his home blood pressures.          Follow Up:   Return for DEPENDING ON RESULTS/ BLOOD PRESSURE, PLAN FOR 1 MONTH AFTER STARTING- SEND [facility] IF YOU START.      Kenny Butt MD  Internal Medicine      Patient asked to sign release of information for outside records if not already requested, make future office/imaging appointments at the  prior to leaving, and to sign up for "[employer]" if not already active.  Preventive measures and further education discussed with patient as per after visit summary. Potential medication side effects discussed. All questions answered to best of ability.   Call office with any questions. Seek emergency care if necessary.   Patient understands and agrees to follow directions and advice.      ----------------------------------------- PATIENT INSTRUCTIONS-----------------------------------------     Patient Instructions   Take 2 tums in the  morning and 2 in the evening before bed fo7-10 days, then see if your symptoms resolve.   Seed probiotic.   If while taking the Tums you have improvement but not resolution then you may either continue the Tums or stop but I would recommend that you continue and then add in a 2-week course of an over-the-counter proton pump inhibitor like omeprazole, esomeprazole, lansoprazole.  And let me know how you do with that.    I have sent you telmisartan 20 mg nightly but I would like for you to monitor your blood pressure at home twice daily for the next 5 to 7 days and if it is consistently greater then 130/80 we should start the telmisartan with the intent of improving lifestyle and then taking this medication away.  A.  It is possible that your blood pressure is genetics.    By treating the blood pressure now you significantly reduce the risk of heart disease stroke and kidney disease as you get into middle-age.      Gastritis (Adult)    Gastritis is inflammation and irritation of the stomach lining. You can have it for a short time (acute) or be long lasting (chronic). Infection with bacteria called H pylori most often causes gastritis. More than a third of people in the [place] have these bacteria in their bodies. In many cases, H pylori causes no problems or symptoms. In some people, though, the infection irritates the stomach lining and causes gastritis. H. pylori may be diagnosed through blood, stool, or breath tests, we well as through biopsy during an endoscopy. Other causes of stomach irritation include drinking alcohol, smoking or chewing tobacco, or taking pain-relieving medicines called NSAIDs (such as aspirin or ibuprofen). Certain drugs (such as cocaine) and immune conditions can also cause gastritis.  Symptoms of gastritis can include:  Belly pain or bloating  Feeling full quickly  Loss of appetite  Nausea or vomiting  Vomiting blood or having black stools  Feeling more tired than usual  An inflamed and  irritated stomach lining is more likely to develop a sore called an ulcer. To help prevent this, gastritis should be treated.  Home care  If needed, our healthcare provider may prescribe medicines. If you have H pylori infection, treating it will likely relieve your symptoms. Other changes can help reduce stomach irritation and help it heal.  If you have been prescribed medicines for H pylori infection, take them as directed. Take all of the medicine until it is finished or your healthcare provider tells you to stop, even if you feel better.  Your healthcare provider may advise you not to take NSAIDs. If you take daily aspirin for your heart or other medical reasons, do not stop without talking to your healthcare provider first.  Don't drink alcohol.  Stop smoking. Smoking can irritate the stomach and delay healing. As much as possible, stay away from second hand smoke.  Follow-up care  Follow up with your healthcare provider, or as advised by our staff. You may need testing to check for inflammation or an ulcer.  When to seek medical advice  Call your healthcare provider for any of the following:  Stomach pain that gets worse or moves to the lower right belly (appendix area)  Chest pain that appears or gets worse, or spreads to the back, neck, shoulder, or arm  Frequent vomiting (can’t keep down liquids)  Blood in the stool or vomit (red or black in color)  Feeling weak or dizzy  Shortness of breath  Unexplained weight loss  Fever of 100.4ºF (38ºC) or higher, or as directed by your healthcare provider    What Is GERD?     With GERD, the weak LES allows food and fluids to travel back, or reflux, into the esophagus.      If you often have a painful burning feeling in your chest after you eat, you may have gastroesophageal reflux disease (GERD). Heartburn that keeps coming back is a classic symptom of GERD. But you may have other symptoms as well. A GERD diagnosis is made only after a complete evaluation by your  healthcare provider.  Note: Chest pain may also be caused by heart problems. Be sure to have all chest pain evaluated by a healthcare provider.   When you have a reflux problem  After you eat, food travels from your mouth down the esophagus to your stomach. Along the way, food passes through a one-way valve called the lower esophageal sphincter (LES). The LES sits at the opening to your stomach. Normally the LES opens when you swallow. It lets food enter the stomach, then closes quickly. With GERD, the LES doesn’t work normally. It lets food and stomach acid flow back (reflux) into the esophagus.  Some common symptoms  Frequent heartburn or burping  Sour-tasting fluid backing up into your mouth  Symptoms that get worse after you eat, bend over, or lie down  Trouble swallowing or pain when swallowing  A dry, long-term (chronic) cough  Upset stomach (nausea) or vomiting  Relieving your discomfort  You and your healthcare provider can work together to find the treatment options that best ease your symptoms. These may include lifestyle changes, medicine, and possibly surgery.  Many people find their GERD symptoms decrease when they eat small frequent meals instead of 3 large ones. Reducing the amount of fatty foods in your diet will also help.   The following foods tend to cause problems for people diagnosed with GERD:  Tomatoes and tomato products  Alcohol  Coffee  Peppermint  Greasy or spicy foods  Talk with your provider if you don’t understand how to make the dietary changes needed to control your GERD symptoms. Your provider can refer you to a nutritionist.    Lifestyle Changes for Controlling GERD  When you have GERD, stomach acid feels as if it’s backing up toward your mouth. Whether or not you take medicine to control your GERD, your symptoms can often be improved with lifestyle changes. Talk to your healthcare provider about the following suggestions. These suggestions may help you get relief from your  symptoms.      Raise your head  Reflux is more likely to strike when you’re lying down flat, because stomach fluid can flow backward more easily. Raising the head of your bed 4 to 6 inches can help. To do this:  Slide blocks or books under the legs at the head of your bed. Or, place a wedge under the mattress. Many [employer] stores can make a suitable wedge for you. The wedge should run from your waist to the top of your head.  Don’t just prop your head on several pillows. This increases pressure on your stomach. It can make GERD worse.  Watch your eating habits  Certain foods may increase the acid in your stomach or relax the lower esophageal sphincter. This makes GERD more likely. It’s best to avoid the following if they cause you symptoms:  Coffee, tea, and carbonated drinks (with and without caffeine)  Fatty, fried, or spicy food  Mint, chocolate, onions, and tomatoes  Peppermint  Any other foods that seem to irritate your stomach or cause you pain  Relieve the pressure  Tips include the following:  Eat smaller meals, even if you have to eat more often.  Don’t lie down right after you eat. Wait a few hours for your stomach to empty.  Avoid tight belts and tight-fitting clothes.  Lose excess weight.  Tobacco and alcohol  Avoid smoking tobacco and drinking alcohol. They can make GERD symptoms worse.    Medicines for GERD  Gastroesophageal reflux disease (GERD) can be treated with medicine. This may be done with a medicine you can buy over the counter. Or it may be done with a medicine that your healthcare provider has to prescribe. In some cases, both types may be used. Your provider will tell you what is best for your symptoms.  Antacids  Antacids work to weaken the acid in your stomach and can give you quick relief. You can buy many of them with no prescription. Antacids can be high in sodium. This may be a problem if you have high blood pressure. Some antacids also have aluminum. This should be avoided if you have  long-term (chronic) kidney disease. So check with your provider first. Take antacids only when you need to, as advised by your provider.  Side effects: Constipation, diarrhea. If you take too much medicine, it can cause calcium to build up.   H-2 blockers  H-2 blockers cause the stomach to make less acid. They are often used both on demand as symptoms occur, and daily to keep symptoms away. Your provider may prescribe them if antacids don’t work for you. You can buy some of them over the counter. These come in a lower dosage.  Side effects: Confusion in older adults  Proton-pump inhibitors  These also cause the stomach to make less acid. They reduce stomach acid more than H-2 blockers. They may be used for a short time, or longer to treat certain conditions. You can buy some of them over the counter. Or your provider may prescribe them. They help control GERD symptoms.  Side effects: Belly (abdominal) pain, diarrhea, upset stomach (nausea). Possible other side effects linked to long-term use and high doses.  Prokinetics  These medicines affect the movement of the digestive tract. They may be recommended if your stomach is emptying too slowly. But in most cases they are not recommended for treating GERD.   Side effects: Tiredness, depression, anxiety, problems with physical movement, belly cramps, constipation, diarrhea, a jittery feeling  Medicines to avoid  Don’t take aspirin without your provider’s approval. And don’t take a nonsteroidal anti-inflammatory drug (NSAID), such as ibuprofen. These reduce the protective lining of your stomach. This can lead to more GERD symptoms. Check with your provider or pharmacist before taking a new medicine.     Omeprazole tablets (OTC)  Brand Name: Prilosec OTC  What is this medicine?  OMEPRAZOLE (oh ME pray zol) prevents the production of acid in the stomach. It is used to treat the symptoms of heartburn. You can buy this medicine without a prescription. This product is not for  long-term use, unless otherwise directed by your doctor or health care professional.  How should I use this medicine?  Take this medicine by mouth. Follow the directions on the product label. If you are taking this medicine without a prescription, take one tablet every day. Do not use for longer than 14 days or repeat a course of treatment more often than every 4 months unless directed by a doctor or healthcare professional. Take your dose at regular intervals every 24 hours. Swallow the tablet whole with a drink of water. Do not crush, break or chew. This medicine works best if taken on an empty stomach 30 minutes before breakfast. If you are using this medicine with the prescription of your doctor or healthcare professional, follow the directions you were given. Do not take your medicine more often than directed.  Talk to your pediatrician regarding the use of this medicine in children. Special care may be needed.  What side effects may I notice from receiving this medicine?  Side effects that you should report to your doctor or health care professional as soon as possible:  allergic reactions like skin rash, itching or hives, swelling of the face, lips, or tongue  bone, muscle or joint pain  breathing problems  chest pain or chest tightness  dark yellow or brown urine  diarrhea  dizziness  fast, irregular heartbeat  feeling faint or lightheaded  fever or sore throat  muscle spasm  palpitations  rash on cheeks or arms that gets worse in the sun  redness, blistering, peeling or loosening of the skin, including inside the mouth  seizures  stomach polyps  tremors  unusual bleeding or bruising  unusually weak or tired  yellowing of the eyes or skin  Side effects that usually do not require medical attention (report to your doctor or health care professional if they continue or are bothersome):  constipation  dry mouth  headache  loose stools  nausea  What may interact with this medicine?  Do not take this medicine  with any of the following medications:  atazanavir  clopidogrel  nelfinavir  This medicine may also interact with the following medications:  ampicillin  certain medicines for anxiety or sleep  certain medicines that treat or prevent blood clots like warfarin  cyclosporine  diazepam  digoxin  disulfiram  iron salts  methotrexate  mycophenolate mofetil  phenytoin  prescription medicine for fungal or yeast infection like itraconazole, ketoconazole, voriconazole  saquinavir  tacrolimus  What if I miss a dose?  If you miss a dose, take it as soon as you can. If it is almost time for your next dose, take only that dose. Do not take double or extra doses.  Where should I keep my medicine?  Keep out of the reach of children.  Store at room temperature between 20 and 25 degrees C (68 and 77 degrees F). Protect from light and moisture. Throw away any unused medicine after the expiration date.  What should I tell my health care provider before I take this medicine?  They need to know if you have any of these conditions:  black or bloody stools  chest pain  difficulty swallowing  have had heartburn for over 3 months  have heartburn with dizziness, lightheadedness or sweating  liver disease  lupus  stomach pain  unexplained weight loss  vomiting with blood  wheezing  an unusual or allergic reaction to omeprazole, other medicines, foods, dyes, or preservatives  pregnant or trying to get pregnant  breast-feeding  What should I watch for while using this medicine?  It can take several days before your heartburn gets better. Check with your doctor or health care professional if your condition does not start to get better, or if it gets worse.  Do not treat diarrhea with over the counter products. Contact your doctor if you have diarrhea that lasts more than 2 days or if it is severe and watery.  Do not treat yourself for heartburn with this medicine for more than 14 days in a row. You should only use this medicine for a 2-week  treatment period once every 4 months. If your symptoms return shortly after your therapy is complete, or within the 4 month time frame, call your doctor or health care professional.  NOTE:This sheet is a summary. It may not cover all possible information. If you have questions about this medicine, talk to your doctor, pharmacist, or health care provider. Copyright© 2019 [employer]

## 2024-09-20 NOTE — PATIENT INSTRUCTIONS
Take 2 tums in the morning and 2 in the evening before bed fo7-10 days, then see if your symptoms resolve.   Seed probiotic.   If while taking the Tums you have improvement but not resolution then you may either continue the Tums or stop but I would recommend that you continue and then add in a 2-week course of an over-the-counter proton pump inhibitor like omeprazole, esomeprazole, lansoprazole.  And let me know how you do with that.    I have sent you telmisartan 20 mg nightly but I would like for you to monitor your blood pressure at home twice daily for the next 5 to 7 days and if it is consistently greater then 130/80 we should start the telmisartan with the intent of improving lifestyle and then taking this medication away.  A.  It is possible that your blood pressure is genetics.    By treating the blood pressure now you significantly reduce the risk of heart disease stroke and kidney disease as you get into middle-age.      Gastritis (Adult)    Gastritis is inflammation and irritation of the stomach lining. You can have it for a short time (acute) or be long lasting (chronic). Infection with bacteria called H pylori most often causes gastritis. More than a third of people in the [place] have these bacteria in their bodies. In many cases, H pylori causes no problems or symptoms. In some people, though, the infection irritates the stomach lining and causes gastritis. H. pylori may be diagnosed through blood, stool, or breath tests, we well as through biopsy during an endoscopy. Other causes of stomach irritation include drinking alcohol, smoking or chewing tobacco, or taking pain-relieving medicines called NSAIDs (such as aspirin or ibuprofen). Certain drugs (such as cocaine) and immune conditions can also cause gastritis.  Symptoms of gastritis can include:  Belly pain or bloating  Feeling full quickly  Loss of appetite  Nausea or vomiting  Vomiting blood or having black stools  Feeling more tired than usual  An  inflamed and irritated stomach lining is more likely to develop a sore called an ulcer. To help prevent this, gastritis should be treated.  Home care  If needed, our healthcare provider may prescribe medicines. If you have H pylori infection, treating it will likely relieve your symptoms. Other changes can help reduce stomach irritation and help it heal.  If you have been prescribed medicines for H pylori infection, take them as directed. Take all of the medicine until it is finished or your healthcare provider tells you to stop, even if you feel better.  Your healthcare provider may advise you not to take NSAIDs. If you take daily aspirin for your heart or other medical reasons, do not stop without talking to your healthcare provider first.  Don't drink alcohol.  Stop smoking. Smoking can irritate the stomach and delay healing. As much as possible, stay away from second hand smoke.  Follow-up care  Follow up with your healthcare provider, or as advised by our staff. You may need testing to check for inflammation or an ulcer.  When to seek medical advice  Call your healthcare provider for any of the following:  Stomach pain that gets worse or moves to the lower right belly (appendix area)  Chest pain that appears or gets worse, or spreads to the back, neck, shoulder, or arm  Frequent vomiting (can’t keep down liquids)  Blood in the stool or vomit (red or black in color)  Feeling weak or dizzy  Shortness of breath  Unexplained weight loss  Fever of 100.4ºF (38ºC) or higher, or as directed by your healthcare provider    What Is GERD?     With GERD, the weak LES allows food and fluids to travel back, or reflux, into the esophagus.      If you often have a painful burning feeling in your chest after you eat, you may have gastroesophageal reflux disease (GERD). Heartburn that keeps coming back is a classic symptom of GERD. But you may have other symptoms as well. A GERD diagnosis is made only after a complete evaluation  by your healthcare provider.  Note: Chest pain may also be caused by heart problems. Be sure to have all chest pain evaluated by a healthcare provider.   When you have a reflux problem  After you eat, food travels from your mouth down the esophagus to your stomach. Along the way, food passes through a one-way valve called the lower esophageal sphincter (LES). The LES sits at the opening to your stomach. Normally the LES opens when you swallow. It lets food enter the stomach, then closes quickly. With GERD, the LES doesn’t work normally. It lets food and stomach acid flow back (reflux) into the esophagus.  Some common symptoms  Frequent heartburn or burping  Sour-tasting fluid backing up into your mouth  Symptoms that get worse after you eat, bend over, or lie down  Trouble swallowing or pain when swallowing  A dry, long-term (chronic) cough  Upset stomach (nausea) or vomiting  Relieving your discomfort  You and your healthcare provider can work together to find the treatment options that best ease your symptoms. These may include lifestyle changes, medicine, and possibly surgery.  Many people find their GERD symptoms decrease when they eat small frequent meals instead of 3 large ones. Reducing the amount of fatty foods in your diet will also help.   The following foods tend to cause problems for people diagnosed with GERD:  Tomatoes and tomato products  Alcohol  Coffee  Peppermint  Greasy or spicy foods  Talk with your provider if you don’t understand how to make the dietary changes needed to control your GERD symptoms. Your provider can refer you to a nutritionist.    Lifestyle Changes for Controlling GERD  When you have GERD, stomach acid feels as if it’s backing up toward your mouth. Whether or not you take medicine to control your GERD, your symptoms can often be improved with lifestyle changes. Talk to your healthcare provider about the following suggestions. These suggestions may help you get relief from your  symptoms.      Raise your head  Reflux is more likely to strike when you’re lying down flat, because stomach fluid can flow backward more easily. Raising the head of your bed 4 to 6 inches can help. To do this:  Slide blocks or books under the legs at the head of your bed. Or, place a wedge under the mattress. Many [employer] stores can make a suitable wedge for you. The wedge should run from your waist to the top of your head.  Don’t just prop your head on several pillows. This increases pressure on your stomach. It can make GERD worse.  Watch your eating habits  Certain foods may increase the acid in your stomach or relax the lower esophageal sphincter. This makes GERD more likely. It’s best to avoid the following if they cause you symptoms:  Coffee, tea, and carbonated drinks (with and without caffeine)  Fatty, fried, or spicy food  Mint, chocolate, onions, and tomatoes  Peppermint  Any other foods that seem to irritate your stomach or cause you pain  Relieve the pressure  Tips include the following:  Eat smaller meals, even if you have to eat more often.  Don’t lie down right after you eat. Wait a few hours for your stomach to empty.  Avoid tight belts and tight-fitting clothes.  Lose excess weight.  Tobacco and alcohol  Avoid smoking tobacco and drinking alcohol. They can make GERD symptoms worse.    Medicines for GERD  Gastroesophageal reflux disease (GERD) can be treated with medicine. This may be done with a medicine you can buy over the counter. Or it may be done with a medicine that your healthcare provider has to prescribe. In some cases, both types may be used. Your provider will tell you what is best for your symptoms.  Antacids  Antacids work to weaken the acid in your stomach and can give you quick relief. You can buy many of them with no prescription. Antacids can be high in sodium. This may be a problem if you have high blood pressure. Some antacids also have aluminum. This should be avoided if you have  long-term (chronic) kidney disease. So check with your provider first. Take antacids only when you need to, as advised by your provider.  Side effects: Constipation, diarrhea. If you take too much medicine, it can cause calcium to build up.   H-2 blockers  H-2 blockers cause the stomach to make less acid. They are often used both on demand as symptoms occur, and daily to keep symptoms away. Your provider may prescribe them if antacids don’t work for you. You can buy some of them over the counter. These come in a lower dosage.  Side effects: Confusion in older adults  Proton-pump inhibitors  These also cause the stomach to make less acid. They reduce stomach acid more than H-2 blockers. They may be used for a short time, or longer to treat certain conditions. You can buy some of them over the counter. Or your provider may prescribe them. They help control GERD symptoms.  Side effects: Belly (abdominal) pain, diarrhea, upset stomach (nausea). Possible other side effects linked to long-term use and high doses.  Prokinetics  These medicines affect the movement of the digestive tract. They may be recommended if your stomach is emptying too slowly. But in most cases they are not recommended for treating GERD.   Side effects: Tiredness, depression, anxiety, problems with physical movement, belly cramps, constipation, diarrhea, a jittery feeling  Medicines to avoid  Don’t take aspirin without your provider’s approval. And don’t take a nonsteroidal anti-inflammatory drug (NSAID), such as ibuprofen. These reduce the protective lining of your stomach. This can lead to more GERD symptoms. Check with your provider or pharmacist before taking a new medicine.     Omeprazole tablets (OTC)  Brand Name: Prilosec OTC  What is this medicine?  OMEPRAZOLE (oh ME pray zol) prevents the production of acid in the stomach. It is used to treat the symptoms of heartburn. You can buy this medicine without a prescription. This product is not for  long-term use, unless otherwise directed by your doctor or health care professional.  How should I use this medicine?  Take this medicine by mouth. Follow the directions on the product label. If you are taking this medicine without a prescription, take one tablet every day. Do not use for longer than 14 days or repeat a course of treatment more often than every 4 months unless directed by a doctor or healthcare professional. Take your dose at regular intervals every 24 hours. Swallow the tablet whole with a drink of water. Do not crush, break or chew. This medicine works best if taken on an empty stomach 30 minutes before breakfast. If you are using this medicine with the prescription of your doctor or healthcare professional, follow the directions you were given. Do not take your medicine more often than directed.  Talk to your pediatrician regarding the use of this medicine in children. Special care may be needed.  What side effects may I notice from receiving this medicine?  Side effects that you should report to your doctor or health care professional as soon as possible:  allergic reactions like skin rash, itching or hives, swelling of the face, lips, or tongue  bone, muscle or joint pain  breathing problems  chest pain or chest tightness  dark yellow or brown urine  diarrhea  dizziness  fast, irregular heartbeat  feeling faint or lightheaded  fever or sore throat  muscle spasm  palpitations  rash on cheeks or arms that gets worse in the sun  redness, blistering, peeling or loosening of the skin, including inside the mouth  seizures  stomach polyps  tremors  unusual bleeding or bruising  unusually weak or tired  yellowing of the eyes or skin  Side effects that usually do not require medical attention (report to your doctor or health care professional if they continue or are bothersome):  constipation  dry mouth  headache  loose stools  nausea  What may interact with this medicine?  Do not take this medicine  with any of the following medications:  atazanavir  clopidogrel  nelfinavir  This medicine may also interact with the following medications:  ampicillin  certain medicines for anxiety or sleep  certain medicines that treat or prevent blood clots like warfarin  cyclosporine  diazepam  digoxin  disulfiram  iron salts  methotrexate  mycophenolate mofetil  phenytoin  prescription medicine for fungal or yeast infection like itraconazole, ketoconazole, voriconazole  saquinavir  tacrolimus  What if I miss a dose?  If you miss a dose, take it as soon as you can. If it is almost time for your next dose, take only that dose. Do not take double or extra doses.  Where should I keep my medicine?  Keep out of the reach of children.  Store at room temperature between 20 and 25 degrees C (68 and 77 degrees F). Protect from light and moisture. Throw away any unused medicine after the expiration date.  What should I tell my health care provider before I take this medicine?  They need to know if you have any of these conditions:  black or bloody stools  chest pain  difficulty swallowing  have had heartburn for over 3 months  have heartburn with dizziness, lightheadedness or sweating  liver disease  lupus  stomach pain  unexplained weight loss  vomiting with blood  wheezing  an unusual or allergic reaction to omeprazole, other medicines, foods, dyes, or preservatives  pregnant or trying to get pregnant  breast-feeding  What should I watch for while using this medicine?  It can take several days before your heartburn gets better. Check with your doctor or health care professional if your condition does not start to get better, or if it gets worse.  Do not treat diarrhea with over the counter products. Contact your doctor if you have diarrhea that lasts more than 2 days or if it is severe and watery.  Do not treat yourself for heartburn with this medicine for more than 14 days in a row. You should only use this medicine for a 2-week  treatment period once every 4 months. If your symptoms return shortly after your therapy is complete, or within the 4 month time frame, call your doctor or health care professional.  NOTE:This sheet is a summary. It may not cover all possible information. If you have questions about this medicine, talk to your doctor, pharmacist, or health care provider. Copyright© 2019 [employer]  [employer]

## 2024-09-22 DIAGNOSIS — Z84.1 FAMILY HISTORY OF KIDNEY DISEASE: Primary | ICD-10-CM

## 2024-10-02 ENCOUNTER — HOSPITAL ENCOUNTER (OUTPATIENT)
Dept: ULTRASOUND IMAGING | Facility: HOSPITAL | Age: 29
Discharge: HOME | End: 2024-10-02
Attending: INTERNAL MEDICINE
Payer: COMMERCIAL

## 2024-10-02 ENCOUNTER — HOSPITAL ENCOUNTER (OUTPATIENT)
Dept: MAMMOGRAPHY | Facility: HOSPITAL | Age: 29
Discharge: HOME | End: 2024-10-02
Attending: INTERNAL MEDICINE
Payer: COMMERCIAL

## 2024-10-02 DIAGNOSIS — N63.0 MASS OF NIPPLE: ICD-10-CM

## 2024-10-02 PROCEDURE — 77062 BREAST TOMOSYNTHESIS BI: CPT | Performed by: INTERNAL MEDICINE

## 2024-10-02 PROCEDURE — 77066 DX MAMMO INCL CAD BI: CPT | Performed by: INTERNAL MEDICINE

## 2024-10-02 PROCEDURE — 76642 ULTRASOUND BREAST LIMITED: CPT | Performed by: INTERNAL MEDICINE

## 2024-12-24 ENCOUNTER — OFFICE VISIT (OUTPATIENT)
Dept: FAMILY MEDICINE CLINIC | Facility: CLINIC | Age: 29
End: 2024-12-24
Payer: COMMERCIAL

## 2024-12-24 VITALS
TEMPERATURE: 97 F | HEART RATE: 79 BPM | SYSTOLIC BLOOD PRESSURE: 136 MMHG | OXYGEN SATURATION: 100 % | RESPIRATION RATE: 18 BRPM | DIASTOLIC BLOOD PRESSURE: 72 MMHG | BODY MASS INDEX: 40.43 KG/M2 | HEIGHT: 74 IN | WEIGHT: 315 LBS

## 2024-12-24 DIAGNOSIS — K64.9 HEMORRHOIDS, UNSPECIFIED HEMORRHOID TYPE: Primary | ICD-10-CM

## 2024-12-24 DIAGNOSIS — K60.2 ANAL FISSURE: ICD-10-CM

## 2024-12-24 PROCEDURE — 99213 OFFICE O/P EST LOW 20 MIN: CPT | Performed by: NURSE PRACTITIONER

## 2024-12-24 PROCEDURE — 3008F BODY MASS INDEX DOCD: CPT | Performed by: NURSE PRACTITIONER

## 2024-12-24 PROCEDURE — 3078F DIAST BP <80 MM HG: CPT | Performed by: NURSE PRACTITIONER

## 2024-12-24 PROCEDURE — 3075F SYST BP GE 130 - 139MM HG: CPT | Performed by: NURSE PRACTITIONER

## 2024-12-24 RX ORDER — HYDROCORTISONE ACETATE PRAMOXINE HCL 1; 1 G/100G; G/100G
1 CREAM TOPICAL 3 TIMES DAILY PRN
Qty: 30 G | Refills: 0 | Status: SHIPPED | OUTPATIENT
Start: 2024-12-24

## 2024-12-24 NOTE — PROGRESS NOTES
Subjective:   Patient ID: Arnav Escamilla is a 29 year old male.    Patient presents for hemorrhoids for 3 months. Reports intermittent constipation and pain around anus in past couple days. Yesterday he noticed a streak of blood when he wiped. Last BM this morning. Reports adequate hydration but poor fiber intake. Denies straining. Denies abdominal pain. He has tried Preperation H and Tucks pads with minimal relief.       History/Other:   Review of Systems   Constitutional:  Negative for chills and fever.   HENT: Negative.     Gastrointestinal:  Positive for constipation and rectal pain. Negative for abdominal pain, anal bleeding, blood in stool, diarrhea, nausea and vomiting.     Current Outpatient Medications   Medication Sig Dispense Refill    Hydrocortisone Ace-Pramoxine 1-1 % External Cream Apply 1 Application topically 3 (three) times daily as needed. 30 g 0    Telmisartan 20 MG Oral Tab Take 1 tablet (20 mg total) by mouth at bedtime. FOR BLOOD PRESSURE. START IF HOME PRESSURES ARE GREATER THAN 130/80 FOR MORE THAN 5 CONSECUTIVE VALUES. 90 tablet 3    clotrimazole 1 % External Cream Apply 1 Application topically 2 (two) times daily as needed (rash). 113 g 5     Allergies:Allergies[1]    Objective:   Physical Exam  Constitutional:       General: He is not in acute distress.     Appearance: Normal appearance. He is not ill-appearing.   HENT:      Head: Normocephalic and atraumatic.   Abdominal:      General: Abdomen is flat. Bowel sounds are normal.      Palpations: Abdomen is soft.   Genitourinary:     Rectum: Tenderness, anal fissure and internal hemorrhoid present.          Comments: Internal hemorrhoid palpated at 12 o'clock. 1 cm fissure as above. No blood noted.   Neurological:      Mental Status: He is alert.       Assessment & Plan:   1. Hemorrhoids, unspecified hemorrhoid type    2. Anal fissure              Meds This Visit:  Requested Prescriptions     Signed Prescriptions Disp Refills     Hydrocortisone Ace-Pramoxine 1-1 % External Cream 30 g 0     Sig: Apply 1 Application topically 3 (three) times daily as needed.     Meds as above. OTC meds reviewed per AVS  Educated on diet and fiber intake. Avoid straining or scratching  Follow up with PCP if no improvement in 3 days       [1] No Known Allergies

## 2025-01-15 ENCOUNTER — OFFICE VISIT (OUTPATIENT)
Facility: LOCATION | Age: 30
End: 2025-01-15
Payer: COMMERCIAL

## 2025-01-15 VITALS
HEART RATE: 79 BPM | SYSTOLIC BLOOD PRESSURE: 136 MMHG | DIASTOLIC BLOOD PRESSURE: 72 MMHG | WEIGHT: 300 LBS | BODY MASS INDEX: 38.5 KG/M2 | HEIGHT: 74 IN | OXYGEN SATURATION: 100 %

## 2025-01-15 DIAGNOSIS — K61.1 RECTAL ABSCESS: Primary | ICD-10-CM

## 2025-01-15 PROCEDURE — 3008F BODY MASS INDEX DOCD: CPT | Performed by: INTERNAL MEDICINE

## 2025-01-15 PROCEDURE — 3078F DIAST BP <80 MM HG: CPT | Performed by: INTERNAL MEDICINE

## 2025-01-15 PROCEDURE — 3075F SYST BP GE 130 - 139MM HG: CPT | Performed by: INTERNAL MEDICINE

## 2025-01-15 PROCEDURE — 99214 OFFICE O/P EST MOD 30 MIN: CPT | Performed by: INTERNAL MEDICINE

## 2025-01-15 NOTE — PROGRESS NOTES
INTERNAL MEDICINE OFFICE NOTE     Patient ID: Arnav Escamilla is a 29 year old male.  Today's Date: 01/15/25  Chief Complaint: Rectal Bleeding    Rectal Bleeding      Pleasant 29-year-old gentleman who a few weeks ago noticed a hard lump and some rectal bleeding, he was treated for hemorrhoids however now he is having pus bleeding and he has pain when he sits.  No fevers or chills but he does note a lump in the left buttocks region.  He does note blood and pus.     Vitals:    01/15/25 1316   BP: 136/72   Pulse: 79   SpO2: 100%   Weight: 300 lb (136.1 kg)   Height: 6' 2\" (1.88 m)     body mass index is 38.52 kg/m².  BP Readings from Last 3 Encounters:   01/15/25 136/72   12/24/24 136/72   09/20/24 138/82     The ASCVD Risk score (Mynor GONZALEZ, et al., 2019) failed to calculate for the following reasons:    The 2019 ASCVD risk score is only valid for ages 40 to 79  Medications reviewed:  Current Outpatient Medications   Medication Sig Dispense Refill    amoxicillin clavulanate 875-125 MG Oral Tab Take 1 tablet by mouth every 8 (eight) hours for 10 days. 30 tablet 0    Hydrocortisone Ace-Pramoxine 1-1 % External Cream Apply 1 Application topically 3 (three) times daily as needed. 30 g 0    Telmisartan 20 MG Oral Tab Take 1 tablet (20 mg total) by mouth at bedtime. FOR BLOOD PRESSURE. START IF HOME PRESSURES ARE GREATER THAN 130/80 FOR MORE THAN 5 CONSECUTIVE VALUES. 90 tablet 3    clotrimazole 1 % External Cream Apply 1 Application topically 2 (two) times daily as needed (rash). 113 g 5         Assessment & Plan    1. Rectal abscess (Primary)  -     CT PELVIS(CONTRAST ONLY) (CPT=72193); Future; Expected date: 01/15/2025  -     Discontinue: Amoxicillin-Pot Clavulanate; Take 1 tablet by mouth 2 (two) times daily for 10 days.  Dispense: 20 tablet; Refill: 0  -     Amoxicillin-Pot Clavulanate; Take 1 tablet by mouth every 8 (eight) hours for 10 days.  Dispense: 30 tablet; Refill: 0  Plan  Looks like this  might be a rectal abscess, up-to-date recommendations reviewed, he would prefer not to use Cipro due to the potential for tendinopathy, we will give him Augmentin 3 times daily and get stat CT abdomen pelvis to evaluate for any fluid collections or fistulous.  If he does not have any improvement or if he worsens by Friday he is to go to the ER for consideration of surgical evaluation.  If he develops any fevers also go to the ER.    Follow Up: Return for AS NEEDED/ IF SYMPTOMS WORSEN, DEPENDING ON RESULTS..         Objective: Results:   Physical Exam  Vitals and nursing note reviewed.   Constitutional:       General: He is not in acute distress.     Appearance: Normal appearance.   HENT:      Head: Normocephalic.      Right Ear: External ear normal.      Left Ear: External ear normal.   Eyes:      Extraocular Movements: Extraocular movements intact.      Conjunctiva/sclera: Conjunctivae normal.   Pulmonary:      Effort: Pulmonary effort is normal.   Genitourinary:     Rectum: Tenderness present.       Musculoskeletal:         General: Normal range of motion.      Cervical back: Normal range of motion and neck supple.   Skin:     Coloration: Skin is not jaundiced.   Neurological:      General: No focal deficit present.      Mental Status: He is alert and oriented to person, place, and time. Mental status is at baseline.   Psychiatric:         Mood and Affect: Mood normal.         Behavior: Behavior normal.        Reviewed:    Patient Active Problem List    Diagnosis    Family history of kidney disease      Allergies[1]     Social History     Socioeconomic History    Marital status: Single   Tobacco Use    Smoking status: Never     Passive exposure: Never    Smokeless tobacco: Never   Vaping Use    Vaping status: Never Used   Substance and Sexual Activity    Alcohol use: Yes     Comment: socially    Drug use: Never      Review of Systems   Gastrointestinal:  Positive for hematochezia.     All other systems negative  unless otherwise stated in ROS or HPI above.       Kenny Butt MD  Internal Medicine       Call office with any questions or seek emergency care if necessary.   Patient understands and agrees to follow directions and advice.      ----------------------------------------- PATIENT INSTRUCTIONS-----------------------------------------   .    There are no Patient Instructions on file for this visit.             [1] No Known Allergies

## 2025-01-16 ENCOUNTER — TELEPHONE (OUTPATIENT)
Facility: LOCATION | Age: 30
End: 2025-01-16

## 2025-01-16 ENCOUNTER — HOSPITAL ENCOUNTER (OUTPATIENT)
Dept: CT IMAGING | Facility: HOSPITAL | Age: 30
Discharge: HOME | End: 2025-01-16
Attending: INTERNAL MEDICINE
Payer: COMMERCIAL

## 2025-01-16 ENCOUNTER — TELEPHONE (OUTPATIENT)
Dept: SURGERY | Facility: CLINIC | Age: 30
End: 2025-01-16

## 2025-01-16 DIAGNOSIS — K61.1 RECTAL ABSCESS: ICD-10-CM

## 2025-01-16 PROBLEM — K61.0 PERIANAL ABSCESS: Status: ACTIVE | Noted: 2025-01-16

## 2025-01-16 LAB
CREAT BLD-MCNC: 1.5 MG/DL
EGFRCR SERPLBLD CKD-EPI 2021: 65 ML/MIN/1.73M2 (ref 60–?)

## 2025-01-16 PROCEDURE — 72193 CT PELVIS W/DYE: CPT | Performed by: INTERNAL MEDICINE

## 2025-01-16 PROCEDURE — 82565 ASSAY OF CREATININE: CPT

## 2025-01-16 NOTE — TELEPHONE ENCOUNTER
Kiesha LEGGETT from Dr. Butt's office asking for sooner appointment for patient's rectal abscess than 1/23 in [place]. Please call patient.

## 2025-01-16 NOTE — TELEPHONE ENCOUNTER
Surgical, Clinical staff, can you please assist with soonest appointment?      Left message to call back on patient's voicemail to call back. Note number for surgeon is [phone]    Spoke to Surgery department, will send message for nurse to assist with sooner appointment.         Please see if patient can be seen by surgeon as soon as possible, next available, he may need I&D but will defer this to surgical expertise given the location.  He has been started on Augmentin.referral placed.        Aaron Alvarado, you do actually have an abscess, continue with the antibiotics but I would like for you to see the surgeon to discuss getting this drained. Please call [phone] and ask for the next available surgeon, a referral has been placed. Ddx: rectal abscess.   Written by Kenny Butt MD on 1/16/2025  3:46 PM CST  Seen by patient Arnav Escamilla on 1/16/2025  4:02 PM

## 2025-01-17 NOTE — TELEPHONE ENCOUNTER
Patient has scheduled an appointment with surgery.    Future Appointments   Date Time Provider Department Center   1/20/2025  3:15 PM Jony Mejia PA-C [id number]

## 2025-01-21 ENCOUNTER — OFFICE VISIT (OUTPATIENT)
Dept: SURGERY | Facility: CLINIC | Age: 30
End: 2025-01-21
Payer: COMMERCIAL

## 2025-01-21 VITALS
SYSTOLIC BLOOD PRESSURE: 154 MMHG | WEIGHT: 300 LBS | HEART RATE: 100 BPM | BODY MASS INDEX: 39 KG/M2 | DIASTOLIC BLOOD PRESSURE: 82 MMHG

## 2025-01-21 DIAGNOSIS — K61.2 ABSCESS OF ANAL AND RECTAL REGIONS: Primary | ICD-10-CM

## 2025-01-21 PROCEDURE — 3077F SYST BP >= 140 MM HG: CPT | Performed by: SURGERY

## 2025-01-21 PROCEDURE — 3079F DIAST BP 80-89 MM HG: CPT | Performed by: SURGERY

## 2025-01-21 PROCEDURE — 99204 OFFICE O/P NEW MOD 45 MIN: CPT | Performed by: SURGERY

## 2025-01-21 NOTE — H&P
HPI:    Patient ID: Arnav Escamilla is a 29 year old male presenting with   Chief Complaint   Patient presents with    Anal Problem     Patient is here for complaints of a rectal abscess with pain.  States he was referred to Dr. Clark.  States the problem began about a couple months ago, CT scan shows abscess.  On antibiotics, the pain is lessened.   .    HPI    History reviewed. No pertinent past medical history.  History reviewed. No pertinent surgical history.  Family History   Problem Relation Age of Onset    Prostate Cancer Maternal Grandfather      Social History     Socioeconomic History    Marital status: Single     Spouse name: Not on file    Number of children: Not on file    Years of education: Not on file    Highest education level: Not on file   Occupational History    Not on file   Tobacco Use    Smoking status: Never     Passive exposure: Never    Smokeless tobacco: Never   Vaping Use    Vaping status: Never Used   Substance and Sexual Activity    Alcohol use: Yes     Comment: socially    Drug use: Never    Sexual activity: Not on file   Other Topics Concern    Not on file   Social History Narrative    Not on file     Social Drivers of Health     Financial Resource Strain: Not on file   Food Insecurity: Not on file   Transportation Needs: Not on file   Physical Activity: Not on file   Stress: Not on file   Social Connections: Not on file   Housing Stability: Not on file       Review of Systems   Constitutional: Negative.    HENT: Negative.     Eyes: Negative.    Respiratory: Negative.     Cardiovascular: Negative.    Gastrointestinal:  Positive for rectal pain.   Endocrine: Negative.    Genitourinary: Negative.    Musculoskeletal: Negative.    Skin: Negative.    Allergic/Immunologic: Negative.    Neurological: Negative.    Hematological:  Does not bruise/bleed easily.   Psychiatric/Behavioral: Negative.             Current Outpatient Medications   Medication Sig Dispense Refill    amoxicillin  clavulanate 875-125 MG Oral Tab Take 1 tablet by mouth every 8 (eight) hours for 10 days. 30 tablet 0    Hydrocortisone Ace-Pramoxine 1-1 % External Cream Apply 1 Application topically 3 (three) times daily as needed. 30 g 0    Telmisartan 20 MG Oral Tab Take 1 tablet (20 mg total) by mouth at bedtime. FOR BLOOD PRESSURE. START IF HOME PRESSURES ARE GREATER THAN 130/80 FOR MORE THAN 5 CONSECUTIVE VALUES. 90 tablet 3    clotrimazole 1 % External Cream Apply 1 Application topically 2 (two) times daily as needed (rash). 113 g 5       Allergies:Allergies[1]   PHYSICAL EXAM:   /82 (BP Location: Right arm, Patient Position: Sitting, Cuff Size: large)   Pulse 100   Wt 300 lb (136.1 kg)   BMI 38.52 kg/m²   Physical Exam  Vitals reviewed.   Constitutional:       Appearance: Normal appearance. He is well-developed.   HENT:      Head: Normocephalic and atraumatic.   Cardiovascular:      Rate and Rhythm: Normal rate and regular rhythm.   Pulmonary:      Effort: Pulmonary effort is normal.      Breath sounds: Normal breath sounds.   Abdominal:      General: There is no distension.      Palpations: Abdomen is soft. There is no mass.      Tenderness: There is no abdominal tenderness. There is no guarding or rebound.   Genitourinary:         Comments: Purulent drainage from tiny open sinus of the left perirectal area  Musculoskeletal:         General: Normal range of motion.      Cervical back: Normal range of motion and neck supple.   Skin:     General: Skin is warm and dry.   Neurological:      Mental Status: He is alert and oriented to person, place, and time.   Psychiatric:         Speech: Speech normal.         Behavior: Behavior normal.                 ASSESSMENT/PLAN:   Diagnoses and all orders for this visit:    Abscess of anal and rectal regions  -     Aerobic Bacterial Culture; Future    Spontaneously draining from small skin opening.  Discussed options of continued observation versus incision for larger wound  opening.  Plan for continued observation, sitz baths and PO abx.  F/u prn persistent pain.       Ricardo Clark MD  1/21/2025       [1] No Known Allergies

## 2025-02-04 ENCOUNTER — PATIENT MESSAGE (OUTPATIENT)
Dept: SURGERY | Facility: CLINIC | Age: 30
End: 2025-02-04

## 2025-02-07 ENCOUNTER — PATIENT MESSAGE (OUTPATIENT)
Facility: LOCATION | Age: 30
End: 2025-02-07

## 2025-02-09 NOTE — TELEPHONE ENCOUNTER
From  Selene Wright RN To  Arnav Escamilla Sent and Delivered  2/9/2025  5:58 AM   Last Read in MyChart  2/9/2025 10:48 AM by Arnav Escamilla

## 2025-02-11 ENCOUNTER — NURSE TRIAGE (OUTPATIENT)
Facility: LOCATION | Age: 30
End: 2025-02-11

## 2025-02-11 ENCOUNTER — LAB ENCOUNTER (OUTPATIENT)
Dept: LAB | Age: 30
End: 2025-02-11
Attending: NURSE PRACTITIONER
Payer: COMMERCIAL

## 2025-02-11 ENCOUNTER — OFFICE VISIT (OUTPATIENT)
Facility: LOCATION | Age: 30
End: 2025-02-11
Payer: COMMERCIAL

## 2025-02-11 ENCOUNTER — OFFICE VISIT (OUTPATIENT)
Dept: SURGERY | Facility: CLINIC | Age: 30
End: 2025-02-11
Payer: COMMERCIAL

## 2025-02-11 VITALS
BODY MASS INDEX: 39 KG/M2 | DIASTOLIC BLOOD PRESSURE: 54 MMHG | SYSTOLIC BLOOD PRESSURE: 106 MMHG | HEART RATE: 73 BPM | WEIGHT: 300 LBS

## 2025-02-11 VITALS
HEIGHT: 74 IN | RESPIRATION RATE: 18 BRPM | SYSTOLIC BLOOD PRESSURE: 147 MMHG | HEART RATE: 92 BPM | DIASTOLIC BLOOD PRESSURE: 83 MMHG | BODY MASS INDEX: 38.61 KG/M2 | WEIGHT: 300.81 LBS | OXYGEN SATURATION: 98 %

## 2025-02-11 DIAGNOSIS — Z11.3 SCREENING FOR STD (SEXUALLY TRANSMITTED DISEASE): ICD-10-CM

## 2025-02-11 DIAGNOSIS — Z11.3 SCREENING FOR STD (SEXUALLY TRANSMITTED DISEASE): Primary | ICD-10-CM

## 2025-02-11 DIAGNOSIS — K61.2 ABSCESS OF ANAL AND RECTAL REGIONS: Primary | ICD-10-CM

## 2025-02-11 DIAGNOSIS — Z86.19 HISTORY OF HERPES SIMPLEX INFECTION: ICD-10-CM

## 2025-02-11 DIAGNOSIS — R30.0 DYSURIA: ICD-10-CM

## 2025-02-11 LAB
BILIRUB UR QL: NEGATIVE
CLARITY UR: CLEAR
GLUCOSE UR-MCNC: NORMAL MG/DL
HBV SURFACE AG SER-ACNC: <0.1 [IU]/L
HBV SURFACE AG SERPL QL IA: NONREACTIVE
HCV AB SERPL QL IA: NONREACTIVE
HGB UR QL STRIP.AUTO: NEGATIVE
KETONES UR-MCNC: NEGATIVE MG/DL
LEUKOCYTE ESTERASE UR QL STRIP.AUTO: NEGATIVE
NITRITE UR QL STRIP.AUTO: NEGATIVE
PH UR: 6.5 [PH] (ref 5–8)
PROT UR-MCNC: NEGATIVE MG/DL
SP GR UR STRIP: 1.01 (ref 1–1.03)
T PALLIDUM AB SER QL IA: NONREACTIVE
UROBILINOGEN UR STRIP-ACNC: NORMAL

## 2025-02-11 PROCEDURE — 3078F DIAST BP <80 MM HG: CPT | Performed by: SURGERY

## 2025-02-11 PROCEDURE — 86695 HERPES SIMPLEX TYPE 1 TEST: CPT

## 2025-02-11 PROCEDURE — 3079F DIAST BP 80-89 MM HG: CPT | Performed by: NURSE PRACTITIONER

## 2025-02-11 PROCEDURE — 3077F SYST BP >= 140 MM HG: CPT | Performed by: NURSE PRACTITIONER

## 2025-02-11 PROCEDURE — 87389 HIV-1 AG W/HIV-1&-2 AB AG IA: CPT

## 2025-02-11 PROCEDURE — 87340 HEPATITIS B SURFACE AG IA: CPT

## 2025-02-11 PROCEDURE — 86803 HEPATITIS C AB TEST: CPT

## 2025-02-11 PROCEDURE — 86696 HERPES SIMPLEX TYPE 2 TEST: CPT

## 2025-02-11 PROCEDURE — 99213 OFFICE O/P EST LOW 20 MIN: CPT | Performed by: NURSE PRACTITIONER

## 2025-02-11 PROCEDURE — 86780 TREPONEMA PALLIDUM: CPT

## 2025-02-11 PROCEDURE — 36415 COLL VENOUS BLD VENIPUNCTURE: CPT

## 2025-02-11 PROCEDURE — 3008F BODY MASS INDEX DOCD: CPT | Performed by: NURSE PRACTITIONER

## 2025-02-11 PROCEDURE — 3074F SYST BP LT 130 MM HG: CPT | Performed by: SURGERY

## 2025-02-11 PROCEDURE — 99212 OFFICE O/P EST SF 10 MIN: CPT | Performed by: SURGERY

## 2025-02-11 RX ORDER — VALACYCLOVIR HYDROCHLORIDE 1 G/1
1000 TABLET, FILM COATED ORAL EVERY 12 HOURS SCHEDULED
Qty: 6 TABLET | Refills: 0 | Status: SHIPPED | OUTPATIENT
Start: 2025-02-11 | End: 2025-02-14

## 2025-02-11 NOTE — PROGRESS NOTES
INTERNAL MEDICINE OFFICE NOTE     Patient ID: Arnav Escamilla is a 29 year old male.  Today's Date: 02/11/25  Chief Complaint: STD (Would like STD testing )    STD  The patient's primary symptoms include penile pain. The patient's pertinent negatives include no penile discharge, scrotal swelling or testicular pain. Associated symptoms include dysuria. Pertinent negatives include no chills, fever or urgency.     Arnav is a 29 year old male, who presents with concerns of STD screening. Pt reports penile burning, dysuria for one month. Has had positive HSV antibody testing in past with similar symptoms. Denies urinary frequency, urgency.  Denies flank pain, fever, hematuria, nausea, or vomiting ,abdominal pain, or scrotal/testicular pain. Denies any penile discharge or lesions. Would like full STD screening today, denies any new partners or exposures. Tolerates PO well at home. No n/v/d. He has taken valtrex in past for these symptoms that worked well. Denies any other aggravating or relieving factors at home. Denies any other treatment attempts prior to arrival.           Vitals:    02/11/25 1106   BP: 147/83   Pulse: 92   Resp: 18   SpO2: 98%   Weight: (!) 300 lb 12.8 oz (136.4 kg)   Height: 6' 2\" (1.88 m)     body mass index is 38.62 kg/m².  BP Readings from Last 3 Encounters:   02/11/25 147/83   01/21/25 154/82   01/15/25 136/72     The ASCVD Risk score (Mynor GONZALEZ, et al., 2019) failed to calculate for the following reasons:    The 2019 ASCVD risk score is only valid for ages 40 to 79  Medications reviewed:  Current Outpatient Medications   Medication Sig Dispense Refill    valACYclovir 1 G Oral Tab Take 1 tablet (1,000 mg total) by mouth every 12 (twelve) hours for 3 days. 6 tablet 0    Hydrocortisone Ace-Pramoxine 1-1 % External Cream Apply 1 Application topically 3 (three) times daily as needed. (Patient not taking: Reported on 2/11/2025) 30 g 0    Telmisartan 20 MG Oral Tab Take 1  tablet (20 mg total) by mouth at bedtime. FOR BLOOD PRESSURE. START IF HOME PRESSURES ARE GREATER THAN 130/80 FOR MORE THAN 5 CONSECUTIVE VALUES. (Patient not taking: Reported on 2/11/2025) 90 tablet 3    clotrimazole 1 % External Cream Apply 1 Application topically 2 (two) times daily as needed (rash). (Patient not taking: Reported on 2/11/2025) 113 g 5         Assessment & Plan    1. Screening for STD (sexually transmitted disease) (Primary)  -     HCV Antibody; Future; Expected date: 02/11/2025  -     Hepatitis B Surface Antigen; Future; Expected date: 02/11/2025  -     HIV Ag/Ab Combo; Future; Expected date: 02/11/2025  -     T Pallidum Screening Cascade; Future; Expected date: 02/11/2025  -     Chlamydia/Gc Amplification; Future; Expected date: 02/11/2025  -     HSV 1 & 2 Glycoprotein Specific AB,IGG; Future; Expected date: 02/11/2025  -     valACYclovir HCl; Take 1 tablet (1,000 mg total) by mouth every 12 (twelve) hours for 3 days.  Dispense: 6 tablet; Refill: 0  -     Chlamydia/Gc Amplification  2. Dysuria  -     Urinalysis, Routine; Future; Expected date: 02/11/2025  -     Urine Culture, Routine; Future; Expected date: 02/11/2025  -     Urinalysis, Routine  -     valACYclovir HCl; Take 1 tablet (1,000 mg total) by mouth every 12 (twelve) hours for 3 days.  Dispense: 6 tablet; Refill: 0  -     Urine Culture, Routine  3. History of herpes simplex infection  -     valACYclovir HCl; Take 1 tablet (1,000 mg total) by mouth every 12 (twelve) hours for 3 days.  Dispense: 6 tablet; Refill: 0      Plan  Urinalysis sent to lab.  Urine culture sent to lab.  Urine GC/Chlamydia sent to lab.  Blood work today for STD screening ordered.     Discussed HPI, physical exam with pt. We discussed uti vs std's or other etiologies.  Treatment options discussed and explained in detail. Empiric treatment for HSV recurrence with valtrex, rx sent to his pharmacy today. Further treatment per results of testing, cultures. The risks,  benefits and potential side effects of the medications were reviewed. Alternatives were discussed.  Monitoring parameters and expected course outlined. We discussed signs and symptoms that should prompt him to go to the emergency department immediately for evaluation including any fevers, flank pain, abdominal pain, scrotal pain, swelling, penile pain, or if he has any concerns. Patient to go to emergency department if symptoms fail to respond as outlined, or worsen in any way. He agreed with the plan.    Follow Up: Return for AS NEEDED/IF SYMPTOMS WORSEN..         Objective: Results:   Physical Exam  Constitutional:       Appearance: Normal appearance. He is not toxic-appearing.   HENT:      Head: Normocephalic and atraumatic.   Cardiovascular:      Rate and Rhythm: Normal rate and regular rhythm.      Heart sounds: Normal heart sounds.   Pulmonary:      Effort: Pulmonary effort is normal.      Breath sounds: Normal breath sounds.   Abdominal:      General: Abdomen is flat. Bowel sounds are normal.      Palpations: Abdomen is soft.      Tenderness: There is no right CVA tenderness or left CVA tenderness.   Genitourinary:     Comments: Declined exam, reports no scrotal swelling, erythema, no penile drainage, no genital rash or lesions.  Skin:     General: Skin is warm and dry.   Neurological:      Mental Status: He is alert.        Reviewed:    Patient Active Problem List    Diagnosis    Abscess of anal and rectal regions    Perianal abscess    Family history of kidney disease      Allergies[1]     Social History     Socioeconomic History    Marital status: Single   Tobacco Use    Smoking status: Never     Passive exposure: Never    Smokeless tobacco: Never   Vaping Use    Vaping status: Never Used   Substance and Sexual Activity    Alcohol use: Yes     Comment: socially    Drug use: Never     Social Drivers of Health     Food Insecurity: No Food Insecurity (2/11/2025)    NCSS - Food Insecurity     Worried About  Running Out of Food in the Last Year: No     Ran Out of Food in the Last Year: No   Transportation Needs: No Transportation Needs (2/11/2025)    NCSS - Transportation     Lack of Transportation: No   Housing Stability: Not At Risk (2/11/2025)    NCSS - Housing/Utilities     Has Housing: Yes     Worried About Losing Housing: No     Unable to Get Utilities: No      Review of Systems   Constitutional:  Negative for chills, fatigue and fever.   Respiratory: Negative.     Cardiovascular: Negative.    Gastrointestinal: Negative.    Genitourinary:  Positive for dysuria and penile pain. Negative for genital sores, hematuria, penile discharge, penile swelling, scrotal swelling, testicular pain and urgency.     All other systems negative unless otherwise stated in ROS or HPI above.       FREDY Cordero  Internal Medicine       Call office with any questions or seek emergency care if necessary.   Patient understands and agrees to follow directions and advice.      ----------------------------------------- PATIENT INSTRUCTIONS-----------------------------------------   .    Patient Instructions   Valtrex antiviral medication sent to the pharmacy.   Please avoid any sexual contact until all lab results are final and treatments are completed and symptoms have resolved.  Continue to monitor symptoms, if you develop any severe symptoms such as inability to urinate, blood in the urine, high fevers, abdominal pain, unable to keep down fluids please go to the emergency room for urgent evaluation.  Follow-up with Dr. Butt as needed if symptoms persist.             [1] No Known Allergies

## 2025-02-11 NOTE — PATIENT INSTRUCTIONS
Valtrex antiviral medication sent to the pharmacy.   Please avoid any sexual contact until all lab results are final and treatments are completed and symptoms have resolved.  Continue to monitor symptoms, if you develop any severe symptoms such as inability to urinate, blood in the urine, high fevers, abdominal pain, unable to keep down fluids please go to the emergency room for urgent evaluation.  Follow-up with Dr. Butt as needed if symptoms persist.

## 2025-02-11 NOTE — PROGRESS NOTES
No more purulent drainage.  Intermittent bloody drainage.    O:  VSS  GEN:  No acute distress  Wound: left 2 o clock position, clear serous drainage, no perirectal erythema.      Assessment   1. Abscess of anal and rectal regions        Spontaneously draining and healing well.  No intervention necessary.  If non healing in 1-2 months then pt might have a fistula.  Would recommend colorectal evaluation at that time.  F/u w me prn.       Ricardo Clark MD

## 2025-02-11 NOTE — TELEPHONE ENCOUNTER
Action Requested: Summary for Provider     []  Critical Lab, Recommendations Needed  [] Need Additional Advice  [x]   FYI    []   Need Orders  [] Need Medications Sent to Pharmacy  []  Other     SUMMARY: Visit today    Reason for call: Burning inside Penis , per patient, history of HSV  Onset: since last Friday     Patient called office. Date of birth and full name both confirmed.   Sent MyChart Message last week, and was advised to call.     RN encouraged patient to call the office for symptoms and timely matters, rather than sending MyChart Message, so that we can assist.  He verbalizes understanding of all information, and agreeable to plan.     Per patient - burning inside penis, at all times.   Denies urinary issues and denies issues with ejaculation  Denies bumps, rash, wounds.   Denies swelling to groin and scrotum    Denies any Known Recent exposures to STI.   But reports similar symptoms last year when he was in fact exposed to HSV.       Triaged and advised care advice     Evaluation advised today      Appointment made.          Future Appointments   Date Time Provider Department Center   2/11/2025 11:00 AM [facility]   2/11/2025  2:50 PM Ricardo Clark MD [facility]         Reason for Disposition   Patient wants to be seen    Protocols used: Penis and Scrotum Symptoms-A-[facility]

## 2025-02-12 LAB
C TRACH DNA SPEC QL NAA+PROBE: NEGATIVE
HSV 1 GLYCOPROTEIN G, IGG: NEGATIVE
HSV 2 GLYCOPROTEIN G, IGG: NEGATIVE
N GONORRHOEA DNA SPEC QL NAA+PROBE: NEGATIVE

## 2025-03-22 ENCOUNTER — HOSPITAL ENCOUNTER (OUTPATIENT)
Age: 30
Discharge: HOME | End: 2025-03-22
Payer: COMMERCIAL

## 2025-03-22 VITALS
SYSTOLIC BLOOD PRESSURE: 137 MMHG | TEMPERATURE: 98 F | OXYGEN SATURATION: 99 % | DIASTOLIC BLOOD PRESSURE: 79 MMHG | HEART RATE: 95 BPM | RESPIRATION RATE: 16 BRPM

## 2025-03-22 DIAGNOSIS — Z09 S/P PERI-RECTAL ABSCESS REPAIR, FOLLOW-UP EXAM: Primary | ICD-10-CM

## 2025-03-22 LAB
#MXD IC: 0.9 X10ˆ3/UL (ref 0.1–1)
HCT VFR BLD AUTO: 40.8 %
HGB BLD-MCNC: 14.1 G/DL
LYMPHOCYTES # BLD AUTO: 2.8 X10ˆ3/UL (ref 1–4)
LYMPHOCYTES NFR BLD AUTO: 39.4 %
MCH RBC QN AUTO: 29.6 PG (ref 26–34)
MCHC RBC AUTO-ENTMCNC: 34.6 G/DL (ref 31–37)
MCV RBC AUTO: 85.7 FL (ref 80–100)
MIXED CELL %: 12.5 %
NEUTROPHILS # BLD AUTO: 3.3 X10ˆ3/UL (ref 1.5–7.7)
NEUTROPHILS NFR BLD AUTO: 48.1 %
PLATELET # BLD AUTO: 199 X10ˆ3/UL (ref 150–450)
RBC # BLD AUTO: 4.76 X10ˆ6/UL
WBC # BLD AUTO: 7 X10ˆ3/UL (ref 4–11)

## 2025-03-22 NOTE — ED PROVIDER NOTES
Chief Complaint   Patient presents with    Derm Problem       HPI:     Arnav Escamilla is a 29 year old male who presents for evaluation of bleeding along the left gluteal region over the last few days, notes 2 months ago was diagnosed by CT scan for a left perianal abscess seen by surgery with spontaneous drainage without intervention or fistula history, patient is scheduled for an outpatient CT within few weeks yet notes increasing bleeding along the previous abscess site last few days placing a dressing to control bleeding, denies notable saturation of dressing over many hours.  Denies associated headache dizziness fevers neck pain chest pain shortness of breath abdominal pain vomiting diarrhea.  Notes normal bowel patterns.  Denies any purulence.      PFSH    PFSH asessment screens reviewed and agree.  Nurses notes reviewed I agree with documentation.    Family History   Problem Relation Age of Onset    Prostate Cancer Maternal Grandfather      Family history reviewed with patient/caregiver and is not pertinent to presenting problem.  Social History     Socioeconomic History    Marital status: Single     Spouse name: Not on file    Number of children: Not on file    Years of education: Not on file    Highest education level: Not on file   Occupational History    Not on file   Tobacco Use    Smoking status: Never     Passive exposure: Never    Smokeless tobacco: Never   Vaping Use    Vaping status: Never Used   Substance and Sexual Activity    Alcohol use: Yes     Comment: socially    Drug use: Never    Sexual activity: Not on file   Other Topics Concern    Not on file   Social History Narrative    Not on file     Social Drivers of Health     Food Insecurity: No Food Insecurity (2/11/2025)    NCSS - Food Insecurity     Worried About Running Out of Food in the Last Year: No     Ran Out of Food in the Last Year: No   Transportation Needs: No Transportation Needs (2/11/2025)    NCSS - Transportation     Lack  of Transportation: No   Housing Stability: Not At Risk (2/11/2025)    NCSS - Housing/Utilities     Has Housing: Yes     Worried About Losing Housing: No     Unable to Get Utilities: No         ROS:   Positive for stated complaint: Bleeding gluteal.  All other systems reviewed and negative except as noted above.  Constitutional and Vital Signs Reviewed.      Physical Exam:     Findings:    /79   Pulse 95   Temp 98.2 °F (36.8 °C) (Oral)   Resp 16   SpO2 99%   GENERAL: well developed, well nourished, well hydrated, no distress  SKIN: good skin turgor, no obvious rashes  EXTREMITIES: Mild not hemorrhaging bleeding along the left medial inferior gluteal region.  No perirectal induration or fluctuance.  No visualized hemorrhoids.  No dehiscence.  No cyanosis or edema. BRUSH without difficulty  GI: soft, non-tender, normal bowel sounds  HEAD: normocephalic, atraumatic  EYES: sclera non icteric bilateral, conjunctiva clear  NEURO: No focal deficits  PSYCH: Alert and oriented x3.  Answering questions appropriately.  Mood appropriate.    MDM/Assessment/Plan:   Orders for this encounter:    Orders Placed This Encounter    POCT CBC     Order Specific Question:   Release to patient     Answer:   Immediate    amoxicillin clavulanate 875-125 MG Oral Tab     Sig: Take 1 tablet by mouth 2 (two) times daily for 10 days.     Dispense:  20 tablet     Refill:  0       Labs performed this visit:  Recent Results (from the past 10 hours)   POCT CBC    Collection Time: 03/22/25 10:59 AM   Result Value Ref Range    WBC IC 7.0 4.0 - 11.0 x10ˆ3/uL    RBC IC 4.76 4.30 - 5.70 X10ˆ6/uL    HGB IC 14.1 13.0 - 17.5 g/dL    HCT IC 40.8 39.0 - 53.0 %    MCV IC 85.7 80.0 - 100.0 fL    MCH IC 29.6 26.0 - 34.0 pg    MCHC IC 34.6 31.0 - 37.0 g/dL    PLT .0 150.0 - 450.0 X10ˆ3/uL    # Neutrophil 3.3 1.5 - 7.7 X10ˆ3/uL    # Lymphocyte 2.8 1.0 - 4.0 X10ˆ3/uL    # Mixed Cells 0.9 0.1 - 1.0 X10ˆ3/uL    Neutrophil % 48.1 %    Lymphocyte % 39.4 %     Mixed Cell % 12.5 %       MDM:  Patient hemoglobin stable, no leukocytosis, patient agrees to follow-up with surgeon without any acute intervention or imaging today based on examination and history, instructed on changes warranting emergent reassessment, happy with plan of care.  Prophylactic Augmentin prescribed after discussion with supervising with patient agreement.  Dr. Reno notified.    Diagnosis:    ICD-10-CM    1. S/P bonnie-rectal abscess repair, follow-up exam  Z09           All results reviewed and discussed with patient.  See AVS for detailed discharge instructions for your condition today.    Follow Up with:  Ricardo Clark MD  [address] [facility] [address]  [facility] [address]  [phone]    Schedule an appointment as soon as possible for a visit in 3 days  READDRESS BY RECOMMENDATION.

## 2025-03-22 NOTE — DISCHARGE INSTRUCTIONS
CHANGE DRESSING TWICE DAILY.     READDRESS EMERGENTLY FOR BREAKTHROUGH BLEEDING/CONCERNS BY INSTRUCTION.     CONTACT SURGEON FOR FOLLOW UP THIS WEEK BY RECOMMENDAITON.     TAKE PROBIOTIC WITH MEDICATION

## (undated) NOTE — LETTER
Date & Time: 8/14/2023, 7:15 PM  Patient: Betsey Goldsmith  Encounter Provider(s):    FREDY Garner       To Whom It May Concern:    Betsey Goldsmith was seen and treated in our department on 8/14/2023. He was advised to quarantine for 5 days beginning tomorrow 8/15/2023. After this, he may return to work as long as he wears a mask for an additional 5 days at all times. If you have any questions or concerns, please do not hesitate to call.       FERCHO Gonzalez  _____________________________  [id number]/DELFINA Signature